# Patient Record
Sex: FEMALE | Race: WHITE | NOT HISPANIC OR LATINO | Employment: OTHER | URBAN - METROPOLITAN AREA
[De-identification: names, ages, dates, MRNs, and addresses within clinical notes are randomized per-mention and may not be internally consistent; named-entity substitution may affect disease eponyms.]

---

## 2024-01-13 ENCOUNTER — APPOINTMENT (OUTPATIENT)
Dept: RADIOLOGY | Facility: MEDICAL CENTER | Age: 69
DRG: 189 | End: 2024-01-13
Attending: STUDENT IN AN ORGANIZED HEALTH CARE EDUCATION/TRAINING PROGRAM
Payer: MEDICARE

## 2024-01-13 ENCOUNTER — HOSPITAL ENCOUNTER (INPATIENT)
Facility: MEDICAL CENTER | Age: 69
LOS: 3 days | DRG: 189 | End: 2024-01-17
Attending: STUDENT IN AN ORGANIZED HEALTH CARE EDUCATION/TRAINING PROGRAM | Admitting: STUDENT IN AN ORGANIZED HEALTH CARE EDUCATION/TRAINING PROGRAM
Payer: MEDICARE

## 2024-01-13 DIAGNOSIS — J96.01 ACUTE RESPIRATORY FAILURE WITH HYPOXIA (HCC): ICD-10-CM

## 2024-01-13 DIAGNOSIS — R53.1 WEAKNESS: ICD-10-CM

## 2024-01-13 DIAGNOSIS — R27.0 ATAXIA: ICD-10-CM

## 2024-01-13 DIAGNOSIS — R09.02 HYPOXIA: ICD-10-CM

## 2024-01-13 DIAGNOSIS — R05.9 COUGH, UNSPECIFIED TYPE: ICD-10-CM

## 2024-01-13 PROBLEM — Z86.73 H/O: CVA (CEREBROVASCULAR ACCIDENT): Status: ACTIVE | Noted: 2024-01-13

## 2024-01-13 PROBLEM — F03.90 DEMENTIA (HCC): Status: ACTIVE | Noted: 2024-01-13

## 2024-01-13 PROBLEM — J96.90 RESPIRATORY FAILURE (HCC): Status: ACTIVE | Noted: 2024-01-13

## 2024-01-13 PROBLEM — R73.9 HYPERGLYCEMIA: Status: ACTIVE | Noted: 2024-01-13

## 2024-01-13 LAB
ALBUMIN SERPL BCP-MCNC: 4.3 G/DL (ref 3.2–4.9)
ALBUMIN/GLOB SERPL: 1.4 G/DL
ALP SERPL-CCNC: 108 U/L (ref 30–99)
ALT SERPL-CCNC: 23 U/L (ref 2–50)
AMPHET UR QL SCN: NEGATIVE
ANION GAP SERPL CALC-SCNC: 13 MMOL/L (ref 7–16)
APPEARANCE UR: CLEAR
AST SERPL-CCNC: 25 U/L (ref 12–45)
BARBITURATES UR QL SCN: NEGATIVE
BASOPHILS # BLD AUTO: 0.9 % (ref 0–1.8)
BASOPHILS # BLD: 0.08 K/UL (ref 0–0.12)
BENZODIAZ UR QL SCN: NEGATIVE
BILIRUB SERPL-MCNC: 0.3 MG/DL (ref 0.1–1.5)
BILIRUB UR QL STRIP.AUTO: NEGATIVE
BUN SERPL-MCNC: 17 MG/DL (ref 8–22)
BZE UR QL SCN: NEGATIVE
CALCIUM ALBUM COR SERPL-MCNC: 8.8 MG/DL (ref 8.5–10.5)
CALCIUM SERPL-MCNC: 9 MG/DL (ref 8.5–10.5)
CANNABINOIDS UR QL SCN: NEGATIVE
CHLORIDE SERPL-SCNC: 103 MMOL/L (ref 96–112)
CO2 SERPL-SCNC: 22 MMOL/L (ref 20–33)
COLOR UR: YELLOW
CREAT SERPL-MCNC: 1.11 MG/DL (ref 0.5–1.4)
CRP SERPL HS-MCNC: 0.64 MG/DL (ref 0–0.75)
EOSINOPHIL # BLD AUTO: 0.06 K/UL (ref 0–0.51)
EOSINOPHIL NFR BLD: 0.7 % (ref 0–6.9)
ERYTHROCYTE [DISTWIDTH] IN BLOOD BY AUTOMATED COUNT: 45.5 FL (ref 35.9–50)
ETHANOL BLD-MCNC: <10.1 MG/DL
FENTANYL UR QL: NEGATIVE
FLUAV RNA SPEC QL NAA+PROBE: NEGATIVE
FLUBV RNA SPEC QL NAA+PROBE: NEGATIVE
GFR SERPLBLD CREATININE-BSD FMLA CKD-EPI: 53 ML/MIN/1.73 M 2
GLOBULIN SER CALC-MCNC: 3 G/DL (ref 1.9–3.5)
GLUCOSE BLD STRIP.AUTO-MCNC: 118 MG/DL (ref 65–99)
GLUCOSE SERPL-MCNC: 134 MG/DL (ref 65–99)
GLUCOSE UR STRIP.AUTO-MCNC: NEGATIVE MG/DL
HCT VFR BLD AUTO: 42.7 % (ref 37–47)
HGB BLD-MCNC: 13.5 G/DL (ref 12–16)
IMM GRANULOCYTES # BLD AUTO: 0.04 K/UL (ref 0–0.11)
IMM GRANULOCYTES NFR BLD AUTO: 0.5 % (ref 0–0.9)
KETONES UR STRIP.AUTO-MCNC: NEGATIVE MG/DL
LACTATE SERPL-SCNC: 1.2 MMOL/L (ref 0.5–2)
LEUKOCYTE ESTERASE UR QL STRIP.AUTO: NEGATIVE
LYMPHOCYTES # BLD AUTO: 0.96 K/UL (ref 1–4.8)
LYMPHOCYTES NFR BLD: 10.9 % (ref 22–41)
MCH RBC QN AUTO: 28.6 PG (ref 27–33)
MCHC RBC AUTO-ENTMCNC: 31.6 G/DL (ref 32.2–35.5)
MCV RBC AUTO: 90.5 FL (ref 81.4–97.8)
METHADONE UR QL SCN: NEGATIVE
MICRO URNS: NORMAL
MONOCYTES # BLD AUTO: 1 K/UL (ref 0–0.85)
MONOCYTES NFR BLD AUTO: 11.3 % (ref 0–13.4)
NEUTROPHILS # BLD AUTO: 6.69 K/UL (ref 1.82–7.42)
NEUTROPHILS NFR BLD: 75.7 % (ref 44–72)
NITRITE UR QL STRIP.AUTO: NEGATIVE
NRBC # BLD AUTO: 0 K/UL
NRBC BLD-RTO: 0 /100 WBC (ref 0–0.2)
NT-PROBNP SERPL IA-MCNC: 545 PG/ML (ref 0–125)
OPIATES UR QL SCN: NEGATIVE
OXYCODONE UR QL SCN: NEGATIVE
PCP UR QL SCN: NEGATIVE
PH UR STRIP.AUTO: 6 [PH] (ref 5–8)
PLATELET # BLD AUTO: 239 K/UL (ref 164–446)
PMV BLD AUTO: 9.7 FL (ref 9–12.9)
POTASSIUM SERPL-SCNC: 3.9 MMOL/L (ref 3.6–5.5)
PROCALCITONIN SERPL-MCNC: 0.09 NG/ML
PROPOXYPH UR QL SCN: NEGATIVE
PROT SERPL-MCNC: 7.3 G/DL (ref 6–8.2)
PROT UR QL STRIP: NEGATIVE MG/DL
RBC # BLD AUTO: 4.72 M/UL (ref 4.2–5.4)
RBC UR QL AUTO: NEGATIVE
RSV RNA SPEC QL NAA+PROBE: NEGATIVE
SARS-COV-2 RNA RESP QL NAA+PROBE: NOTDETECTED
SODIUM SERPL-SCNC: 138 MMOL/L (ref 135–145)
SP GR UR STRIP.AUTO: >=1.03
TROPONIN T SERPL-MCNC: 15 NG/L (ref 6–19)
UROBILINOGEN UR STRIP.AUTO-MCNC: 0.2 MG/DL
WBC # BLD AUTO: 8.8 K/UL (ref 4.8–10.8)

## 2024-01-13 PROCEDURE — 94640 AIRWAY INHALATION TREATMENT: CPT

## 2024-01-13 PROCEDURE — 86140 C-REACTIVE PROTEIN: CPT

## 2024-01-13 PROCEDURE — 83605 ASSAY OF LACTIC ACID: CPT

## 2024-01-13 PROCEDURE — 99285 EMERGENCY DEPT VISIT HI MDM: CPT

## 2024-01-13 PROCEDURE — 85025 COMPLETE CBC W/AUTO DIFF WBC: CPT

## 2024-01-13 PROCEDURE — 84145 PROCALCITONIN (PCT): CPT

## 2024-01-13 PROCEDURE — 700102 HCHG RX REV CODE 250 W/ 637 OVERRIDE(OP): Performed by: STUDENT IN AN ORGANIZED HEALTH CARE EDUCATION/TRAINING PROGRAM

## 2024-01-13 PROCEDURE — 81003 URINALYSIS AUTO W/O SCOPE: CPT

## 2024-01-13 PROCEDURE — 83880 ASSAY OF NATRIURETIC PEPTIDE: CPT

## 2024-01-13 PROCEDURE — 700111 HCHG RX REV CODE 636 W/ 250 OVERRIDE (IP): Mod: JZ | Performed by: STUDENT IN AN ORGANIZED HEALTH CARE EDUCATION/TRAINING PROGRAM

## 2024-01-13 PROCEDURE — 99222 1ST HOSP IP/OBS MODERATE 55: CPT | Performed by: STUDENT IN AN ORGANIZED HEALTH CARE EDUCATION/TRAINING PROGRAM

## 2024-01-13 PROCEDURE — 82077 ASSAY SPEC XCP UR&BREATH IA: CPT

## 2024-01-13 PROCEDURE — 70450 CT HEAD/BRAIN W/O DYE: CPT

## 2024-01-13 PROCEDURE — 71045 X-RAY EXAM CHEST 1 VIEW: CPT

## 2024-01-13 PROCEDURE — G0378 HOSPITAL OBSERVATION PER HR: HCPCS

## 2024-01-13 PROCEDURE — 85652 RBC SED RATE AUTOMATED: CPT

## 2024-01-13 PROCEDURE — A9270 NON-COVERED ITEM OR SERVICE: HCPCS | Performed by: STUDENT IN AN ORGANIZED HEALTH CARE EDUCATION/TRAINING PROGRAM

## 2024-01-13 PROCEDURE — 82962 GLUCOSE BLOOD TEST: CPT

## 2024-01-13 PROCEDURE — 700101 HCHG RX REV CODE 250: Performed by: STUDENT IN AN ORGANIZED HEALTH CARE EDUCATION/TRAINING PROGRAM

## 2024-01-13 PROCEDURE — 700105 HCHG RX REV CODE 258: Performed by: STUDENT IN AN ORGANIZED HEALTH CARE EDUCATION/TRAINING PROGRAM

## 2024-01-13 PROCEDURE — 36415 COLL VENOUS BLD VENIPUNCTURE: CPT

## 2024-01-13 PROCEDURE — 80053 COMPREHEN METABOLIC PANEL: CPT

## 2024-01-13 PROCEDURE — 87040 BLOOD CULTURE FOR BACTERIA: CPT

## 2024-01-13 PROCEDURE — 0241U HCHG SARS-COV-2 COVID-19 NFCT DS RESP RNA 4 TRGT ED POC: CPT

## 2024-01-13 PROCEDURE — 93005 ELECTROCARDIOGRAM TRACING: CPT | Performed by: STUDENT IN AN ORGANIZED HEALTH CARE EDUCATION/TRAINING PROGRAM

## 2024-01-13 PROCEDURE — 84484 ASSAY OF TROPONIN QUANT: CPT

## 2024-01-13 PROCEDURE — 96374 THER/PROPH/DIAG INJ IV PUSH: CPT

## 2024-01-13 PROCEDURE — 96375 TX/PRO/DX INJ NEW DRUG ADDON: CPT

## 2024-01-13 PROCEDURE — 80307 DRUG TEST PRSMV CHEM ANLYZR: CPT

## 2024-01-13 RX ORDER — FUROSEMIDE 10 MG/ML
10 INJECTION INTRAMUSCULAR; INTRAVENOUS
Status: DISCONTINUED | OUTPATIENT
Start: 2024-01-13 | End: 2024-01-14

## 2024-01-13 RX ORDER — IPRATROPIUM BROMIDE AND ALBUTEROL SULFATE 2.5; .5 MG/3ML; MG/3ML
3 SOLUTION RESPIRATORY (INHALATION)
Status: ACTIVE | OUTPATIENT
Start: 2024-01-13 | End: 2024-01-14

## 2024-01-13 RX ORDER — GUAIFENESIN/DEXTROMETHORPHAN 100-10MG/5
10 SYRUP ORAL EVERY 6 HOURS PRN
Status: DISCONTINUED | OUTPATIENT
Start: 2024-01-13 | End: 2024-01-17 | Stop reason: HOSPADM

## 2024-01-13 RX ORDER — ACETAMINOPHEN 325 MG/1
650 TABLET ORAL EVERY 6 HOURS PRN
Status: DISCONTINUED | OUTPATIENT
Start: 2024-01-13 | End: 2024-01-17 | Stop reason: HOSPADM

## 2024-01-13 RX ORDER — LABETALOL HYDROCHLORIDE 5 MG/ML
10 INJECTION, SOLUTION INTRAVENOUS EVERY 4 HOURS PRN
Status: DISCONTINUED | OUTPATIENT
Start: 2024-01-13 | End: 2024-01-17 | Stop reason: HOSPADM

## 2024-01-13 RX ORDER — MEMANTINE HYDROCHLORIDE 10 MG/1
10 TABLET ORAL 2 TIMES DAILY
Status: DISCONTINUED | OUTPATIENT
Start: 2024-01-13 | End: 2024-01-17 | Stop reason: HOSPADM

## 2024-01-13 RX ORDER — AMOXICILLIN 250 MG
2 CAPSULE ORAL 2 TIMES DAILY
Status: DISCONTINUED | OUTPATIENT
Start: 2024-01-13 | End: 2024-01-17 | Stop reason: HOSPADM

## 2024-01-13 RX ORDER — ASPIRIN 300 MG/1
300 SUPPOSITORY RECTAL DAILY
Status: DISCONTINUED | OUTPATIENT
Start: 2024-01-14 | End: 2024-01-13

## 2024-01-13 RX ORDER — BISACODYL 10 MG
10 SUPPOSITORY, RECTAL RECTAL
Status: DISCONTINUED | OUTPATIENT
Start: 2024-01-13 | End: 2024-01-17 | Stop reason: HOSPADM

## 2024-01-13 RX ORDER — POLYETHYLENE GLYCOL 3350 17 G/17G
1 POWDER, FOR SOLUTION ORAL
Status: DISCONTINUED | OUTPATIENT
Start: 2024-01-13 | End: 2024-01-17 | Stop reason: HOSPADM

## 2024-01-13 RX ORDER — QUETIAPINE FUMARATE 100 MG/1
100 TABLET, FILM COATED ORAL 2 TIMES DAILY
COMMUNITY

## 2024-01-13 RX ORDER — ATORVASTATIN CALCIUM 40 MG/1
40 TABLET, FILM COATED ORAL EVERY EVENING
Status: DISCONTINUED | OUTPATIENT
Start: 2024-01-13 | End: 2024-01-17 | Stop reason: HOSPADM

## 2024-01-13 RX ORDER — LORAZEPAM 0.5 MG/1
0.5 TABLET ORAL
Status: DISCONTINUED | OUTPATIENT
Start: 2024-01-13 | End: 2024-01-17 | Stop reason: HOSPADM

## 2024-01-13 RX ORDER — ESCITALOPRAM OXALATE 10 MG/1
20 TABLET ORAL DAILY
Status: DISCONTINUED | OUTPATIENT
Start: 2024-01-14 | End: 2024-01-17 | Stop reason: HOSPADM

## 2024-01-13 RX ORDER — DONEPEZIL HYDROCHLORIDE 10 MG/1
10 TABLET, FILM COATED ORAL DAILY
COMMUNITY

## 2024-01-13 RX ORDER — SODIUM CHLORIDE, SODIUM LACTATE, POTASSIUM CHLORIDE, AND CALCIUM CHLORIDE .6; .31; .03; .02 G/100ML; G/100ML; G/100ML; G/100ML
500 INJECTION, SOLUTION INTRAVENOUS
Status: DISCONTINUED | OUTPATIENT
Start: 2024-01-13 | End: 2024-01-17 | Stop reason: HOSPADM

## 2024-01-13 RX ORDER — SODIUM CHLORIDE, SODIUM LACTATE, POTASSIUM CHLORIDE, CALCIUM CHLORIDE 600; 310; 30; 20 MG/100ML; MG/100ML; MG/100ML; MG/100ML
1000 INJECTION, SOLUTION INTRAVENOUS ONCE
Status: COMPLETED | OUTPATIENT
Start: 2024-01-13 | End: 2024-01-13

## 2024-01-13 RX ORDER — IPRATROPIUM BROMIDE AND ALBUTEROL SULFATE 2.5; .5 MG/3ML; MG/3ML
3 SOLUTION RESPIRATORY (INHALATION)
Status: DISCONTINUED | OUTPATIENT
Start: 2024-01-13 | End: 2024-01-17 | Stop reason: HOSPADM

## 2024-01-13 RX ORDER — DONEPEZIL HYDROCHLORIDE 5 MG/1
10 TABLET, FILM COATED ORAL DAILY
Status: DISCONTINUED | OUTPATIENT
Start: 2024-01-14 | End: 2024-01-17 | Stop reason: HOSPADM

## 2024-01-13 RX ORDER — BENZONATATE 100 MG/1
100 CAPSULE ORAL 3 TIMES DAILY PRN
Status: DISCONTINUED | OUTPATIENT
Start: 2024-01-13 | End: 2024-01-17 | Stop reason: HOSPADM

## 2024-01-13 RX ORDER — ONDANSETRON 2 MG/ML
4 INJECTION INTRAMUSCULAR; INTRAVENOUS EVERY 4 HOURS PRN
Status: DISCONTINUED | OUTPATIENT
Start: 2024-01-13 | End: 2024-01-17 | Stop reason: HOSPADM

## 2024-01-13 RX ORDER — ASPIRIN 81 MG/1
81 TABLET ORAL DAILY
Status: DISCONTINUED | OUTPATIENT
Start: 2024-01-14 | End: 2024-01-17 | Stop reason: HOSPADM

## 2024-01-13 RX ORDER — ENOXAPARIN SODIUM 100 MG/ML
40 INJECTION SUBCUTANEOUS DAILY
Status: DISCONTINUED | OUTPATIENT
Start: 2024-01-14 | End: 2024-01-17 | Stop reason: HOSPADM

## 2024-01-13 RX ORDER — MEMANTINE HYDROCHLORIDE 10 MG/1
10 TABLET ORAL 2 TIMES DAILY
COMMUNITY

## 2024-01-13 RX ORDER — QUETIAPINE FUMARATE 100 MG/1
100 TABLET, FILM COATED ORAL 2 TIMES DAILY
Status: DISCONTINUED | OUTPATIENT
Start: 2024-01-13 | End: 2024-01-17 | Stop reason: HOSPADM

## 2024-01-13 RX ORDER — ASPIRIN 81 MG/1
81 TABLET, CHEWABLE ORAL DAILY
Status: DISCONTINUED | OUTPATIENT
Start: 2024-01-14 | End: 2024-01-13

## 2024-01-13 RX ORDER — ESCITALOPRAM OXALATE 20 MG/1
20 TABLET ORAL DAILY
COMMUNITY

## 2024-01-13 RX ORDER — IPRATROPIUM BROMIDE AND ALBUTEROL SULFATE 2.5; .5 MG/3ML; MG/3ML
3 SOLUTION RESPIRATORY (INHALATION) ONCE
Status: COMPLETED | OUTPATIENT
Start: 2024-01-13 | End: 2024-01-13

## 2024-01-13 RX ORDER — ONDANSETRON 4 MG/1
4 TABLET, ORALLY DISINTEGRATING ORAL EVERY 4 HOURS PRN
Status: DISCONTINUED | OUTPATIENT
Start: 2024-01-13 | End: 2024-01-17 | Stop reason: HOSPADM

## 2024-01-13 RX ADMIN — IPRATROPIUM BROMIDE AND ALBUTEROL SULFATE 3 ML: 2.5; .5 SOLUTION RESPIRATORY (INHALATION) at 21:47

## 2024-01-13 RX ADMIN — MEMANTINE HYDROCHLORIDE 10 MG: 10 TABLET ORAL at 23:11

## 2024-01-13 RX ADMIN — FUROSEMIDE 10 MG: 10 INJECTION, SOLUTION INTRAVENOUS at 23:11

## 2024-01-13 RX ADMIN — QUETIAPINE FUMARATE 100 MG: 100 TABLET ORAL at 23:11

## 2024-01-13 RX ADMIN — SODIUM CHLORIDE, POTASSIUM CHLORIDE, SODIUM LACTATE AND CALCIUM CHLORIDE 1000 ML: 600; 310; 30; 20 INJECTION, SOLUTION INTRAVENOUS at 17:00

## 2024-01-13 RX ADMIN — ATORVASTATIN CALCIUM 40 MG: 40 TABLET, FILM COATED ORAL at 23:11

## 2024-01-13 ASSESSMENT — FIBROSIS 4 INDEX: FIB4 SCORE: 1.53

## 2024-01-13 ASSESSMENT — ENCOUNTER SYMPTOMS
PALPITATIONS: 0
BLURRED VISION: 0
WEAKNESS: 1
FEVER: 0
DIZZINESS: 0
SEIZURES: 0
CHILLS: 0
NAUSEA: 0
LOSS OF CONSCIOUSNESS: 0
SHORTNESS OF BREATH: 1
DEPRESSION: 0
BRUISES/BLEEDS EASILY: 0
MYALGIAS: 0
COUGH: 0
VOMITING: 0
TINGLING: 0
SPEECH CHANGE: 0
HEARTBURN: 0
ABDOMINAL PAIN: 0
SENSORY CHANGE: 0
HEADACHES: 0
TREMORS: 1
FOCAL WEAKNESS: 0
DOUBLE VISION: 0

## 2024-01-13 ASSESSMENT — LIFESTYLE VARIABLES
DOES PATIENT WANT TO STOP DRINKING: NO
ALCOHOL_USE: NO
EVER FELT BAD OR GUILTY ABOUT YOUR DRINKING: NO
EVER HAD A DRINK FIRST THING IN THE MORNING TO STEADY YOUR NERVES TO GET RID OF A HANGOVER: NO
CONSUMPTION TOTAL: NEGATIVE
AVERAGE NUMBER OF DAYS PER WEEK YOU HAVE A DRINK CONTAINING ALCOHOL: 0
HOW MANY TIMES IN THE PAST YEAR HAVE YOU HAD 5 OR MORE DRINKS IN A DAY: 0
ON A TYPICAL DAY WHEN YOU DRINK ALCOHOL HOW MANY DRINKS DO YOU HAVE: 0
HAVE YOU EVER FELT YOU SHOULD CUT DOWN ON YOUR DRINKING: NO
TOTAL SCORE: 0
TOTAL SCORE: 0
SUBSTANCE_ABUSE: 0
HAVE PEOPLE ANNOYED YOU BY CRITICIZING YOUR DRINKING: NO
TOTAL SCORE: 0

## 2024-01-13 ASSESSMENT — PATIENT HEALTH QUESTIONNAIRE - PHQ9
2. FEELING DOWN, DEPRESSED, IRRITABLE, OR HOPELESS: NOT AT ALL
1. LITTLE INTEREST OR PLEASURE IN DOING THINGS: NOT AT ALL
SUM OF ALL RESPONSES TO PHQ9 QUESTIONS 1 AND 2: 0

## 2024-01-13 ASSESSMENT — PAIN DESCRIPTION - PAIN TYPE: TYPE: ACUTE PAIN

## 2024-01-13 NOTE — ED TRIAGE NOTES
"Chief Complaint   Patient presents with    ALOC     Per , pt awoke this morning altered and behaving strangely. Talking to herself.   Generalized weakness.   .   Hx of dementia, not currently baseline.     Weakness     Pt to triage via w/c for above.  A&Ox 0.   Pt urinated on arrival to ER while in w/c.   Pupils enlarged.    Protocol ordered.     /63   Pulse (!) 132   Temp 36.8 °C (98.3 °F) (Temporal)   Resp 18   Ht 1.651 m (5' 5\")   Wt 61.2 kg (135 lb)   SpO2 90%   BMI 22.47 kg/m²     "

## 2024-01-14 ENCOUNTER — APPOINTMENT (OUTPATIENT)
Dept: CARDIOLOGY | Facility: MEDICAL CENTER | Age: 69
DRG: 189 | End: 2024-01-14
Attending: STUDENT IN AN ORGANIZED HEALTH CARE EDUCATION/TRAINING PROGRAM
Payer: MEDICARE

## 2024-01-14 ENCOUNTER — APPOINTMENT (OUTPATIENT)
Dept: RADIOLOGY | Facility: MEDICAL CENTER | Age: 69
DRG: 189 | End: 2024-01-14
Attending: HOSPITALIST
Payer: MEDICARE

## 2024-01-14 PROBLEM — R55 SYNCOPE AND COLLAPSE: Status: ACTIVE | Noted: 2024-01-14

## 2024-01-14 PROBLEM — I95.9 HYPOTENSION: Status: ACTIVE | Noted: 2024-01-14

## 2024-01-14 LAB
ALBUMIN SERPL BCP-MCNC: 3.6 G/DL (ref 3.2–4.9)
ALBUMIN/GLOB SERPL: 1.3 G/DL
ALP SERPL-CCNC: 91 U/L (ref 30–99)
ALT SERPL-CCNC: 19 U/L (ref 2–50)
ANION GAP SERPL CALC-SCNC: 13 MMOL/L (ref 7–16)
AST SERPL-CCNC: 21 U/L (ref 12–45)
BASOPHILS # BLD AUTO: 0.8 % (ref 0–1.8)
BASOPHILS # BLD: 0.05 K/UL (ref 0–0.12)
BILIRUB SERPL-MCNC: 0.3 MG/DL (ref 0.1–1.5)
BUN SERPL-MCNC: 15 MG/DL (ref 8–22)
CALCIUM ALBUM COR SERPL-MCNC: 8.8 MG/DL (ref 8.5–10.5)
CALCIUM SERPL-MCNC: 8.5 MG/DL (ref 8.5–10.5)
CHLORIDE SERPL-SCNC: 103 MMOL/L (ref 96–112)
CHOLEST SERPL-MCNC: 134 MG/DL (ref 100–199)
CO2 SERPL-SCNC: 21 MMOL/L (ref 20–33)
CREAT SERPL-MCNC: 0.94 MG/DL (ref 0.5–1.4)
EKG IMPRESSION: NORMAL
EKG IMPRESSION: NORMAL
EOSINOPHIL # BLD AUTO: 0.02 K/UL (ref 0–0.51)
EOSINOPHIL NFR BLD: 0.3 % (ref 0–6.9)
ERYTHROCYTE [DISTWIDTH] IN BLOOD BY AUTOMATED COUNT: 44.8 FL (ref 35.9–50)
ERYTHROCYTE [SEDIMENTATION RATE] IN BLOOD BY WESTERGREN METHOD: 6 MM/HOUR (ref 0–25)
EST. AVERAGE GLUCOSE BLD GHB EST-MCNC: 114 MG/DL
GFR SERPLBLD CREATININE-BSD FMLA CKD-EPI: 65 ML/MIN/1.73 M 2
GLOBULIN SER CALC-MCNC: 2.7 G/DL (ref 1.9–3.5)
GLUCOSE SERPL-MCNC: 145 MG/DL (ref 65–99)
HBA1C MFR BLD: 5.6 % (ref 4–5.6)
HCT VFR BLD AUTO: 36.9 % (ref 37–47)
HDLC SERPL-MCNC: 58 MG/DL
HGB BLD-MCNC: 12.3 G/DL (ref 12–16)
IMM GRANULOCYTES # BLD AUTO: 0.02 K/UL (ref 0–0.11)
IMM GRANULOCYTES NFR BLD AUTO: 0.3 % (ref 0–0.9)
LDLC SERPL CALC-MCNC: 64 MG/DL
LV EJECT FRACT  99904: 60
LV EJECT FRACT MOD 2C 99903: 76.38
LV EJECT FRACT MOD 4C 99902: 59.26
LV EJECT FRACT MOD BP 99901: 66.74
LYMPHOCYTES # BLD AUTO: 1.17 K/UL (ref 1–4.8)
LYMPHOCYTES NFR BLD: 17.9 % (ref 22–41)
MAGNESIUM SERPL-MCNC: 1.8 MG/DL (ref 1.5–2.5)
MCH RBC QN AUTO: 29.3 PG (ref 27–33)
MCHC RBC AUTO-ENTMCNC: 33.3 G/DL (ref 32.2–35.5)
MCV RBC AUTO: 87.9 FL (ref 81.4–97.8)
MONOCYTES # BLD AUTO: 0.75 K/UL (ref 0–0.85)
MONOCYTES NFR BLD AUTO: 11.5 % (ref 0–13.4)
NEUTROPHILS # BLD AUTO: 4.51 K/UL (ref 1.82–7.42)
NEUTROPHILS NFR BLD: 69.2 % (ref 44–72)
NRBC # BLD AUTO: 0 K/UL
NRBC BLD-RTO: 0 /100 WBC (ref 0–0.2)
PHOSPHATE SERPL-MCNC: 3.1 MG/DL (ref 2.5–4.5)
PLATELET # BLD AUTO: 204 K/UL (ref 164–446)
PMV BLD AUTO: 10 FL (ref 9–12.9)
POTASSIUM SERPL-SCNC: 3.6 MMOL/L (ref 3.6–5.5)
PROT SERPL-MCNC: 6.3 G/DL (ref 6–8.2)
RBC # BLD AUTO: 4.2 M/UL (ref 4.2–5.4)
SODIUM SERPL-SCNC: 137 MMOL/L (ref 135–145)
TRIGL SERPL-MCNC: 62 MG/DL (ref 0–149)
TROPONIN T SERPL-MCNC: 16 NG/L (ref 6–19)
WBC # BLD AUTO: 6.5 K/UL (ref 4.8–10.8)

## 2024-01-14 PROCEDURE — 92610 EVALUATE SWALLOWING FUNCTION: CPT

## 2024-01-14 PROCEDURE — 85025 COMPLETE CBC W/AUTO DIFF WBC: CPT

## 2024-01-14 PROCEDURE — 700102 HCHG RX REV CODE 250 W/ 637 OVERRIDE(OP): Performed by: STUDENT IN AN ORGANIZED HEALTH CARE EDUCATION/TRAINING PROGRAM

## 2024-01-14 PROCEDURE — 87040 BLOOD CULTURE FOR BACTERIA: CPT

## 2024-01-14 PROCEDURE — 83735 ASSAY OF MAGNESIUM: CPT

## 2024-01-14 PROCEDURE — 80061 LIPID PANEL: CPT

## 2024-01-14 PROCEDURE — 94640 AIRWAY INHALATION TREATMENT: CPT

## 2024-01-14 PROCEDURE — 93005 ELECTROCARDIOGRAM TRACING: CPT | Performed by: STUDENT IN AN ORGANIZED HEALTH CARE EDUCATION/TRAINING PROGRAM

## 2024-01-14 PROCEDURE — 700105 HCHG RX REV CODE 258

## 2024-01-14 PROCEDURE — 99232 SBSQ HOSP IP/OBS MODERATE 35: CPT | Performed by: HOSPITALIST

## 2024-01-14 PROCEDURE — 770001 HCHG ROOM/CARE - MED/SURG/GYN PRIV*

## 2024-01-14 PROCEDURE — 84100 ASSAY OF PHOSPHORUS: CPT

## 2024-01-14 PROCEDURE — 700111 HCHG RX REV CODE 636 W/ 250 OVERRIDE (IP): Mod: JZ | Performed by: STUDENT IN AN ORGANIZED HEALTH CARE EDUCATION/TRAINING PROGRAM

## 2024-01-14 PROCEDURE — 84484 ASSAY OF TROPONIN QUANT: CPT

## 2024-01-14 PROCEDURE — 83036 HEMOGLOBIN GLYCOSYLATED A1C: CPT

## 2024-01-14 PROCEDURE — A9270 NON-COVERED ITEM OR SERVICE: HCPCS | Performed by: STUDENT IN AN ORGANIZED HEALTH CARE EDUCATION/TRAINING PROGRAM

## 2024-01-14 PROCEDURE — 93010 ELECTROCARDIOGRAM REPORT: CPT | Performed by: INTERNAL MEDICINE

## 2024-01-14 PROCEDURE — 97535 SELF CARE MNGMENT TRAINING: CPT

## 2024-01-14 PROCEDURE — 97163 PT EVAL HIGH COMPLEX 45 MIN: CPT

## 2024-01-14 PROCEDURE — 700117 HCHG RX CONTRAST REV CODE 255: Performed by: STUDENT IN AN ORGANIZED HEALTH CARE EDUCATION/TRAINING PROGRAM

## 2024-01-14 PROCEDURE — 93306 TTE W/DOPPLER COMPLETE: CPT

## 2024-01-14 PROCEDURE — 80053 COMPREHEN METABOLIC PANEL: CPT

## 2024-01-14 PROCEDURE — 70551 MRI BRAIN STEM W/O DYE: CPT

## 2024-01-14 PROCEDURE — 700101 HCHG RX REV CODE 250: Performed by: STUDENT IN AN ORGANIZED HEALTH CARE EDUCATION/TRAINING PROGRAM

## 2024-01-14 PROCEDURE — 93306 TTE W/DOPPLER COMPLETE: CPT | Mod: 26 | Performed by: INTERNAL MEDICINE

## 2024-01-14 RX ORDER — SODIUM CHLORIDE, SODIUM LACTATE, POTASSIUM CHLORIDE, AND CALCIUM CHLORIDE .6; .31; .03; .02 G/100ML; G/100ML; G/100ML; G/100ML
500 INJECTION, SOLUTION INTRAVENOUS ONCE
Status: COMPLETED | OUTPATIENT
Start: 2024-01-14 | End: 2024-01-14

## 2024-01-14 RX ADMIN — HUMAN ALBUMIN MICROSPHERES AND PERFLUTREN 3 ML: 10; .22 INJECTION, SOLUTION INTRAVENOUS at 11:45

## 2024-01-14 RX ADMIN — DOCUSATE SODIUM 50 MG AND SENNOSIDES 8.6 MG 2 TABLET: 8.6; 5 TABLET, FILM COATED ORAL at 05:38

## 2024-01-14 RX ADMIN — QUETIAPINE FUMARATE 100 MG: 100 TABLET ORAL at 17:32

## 2024-01-14 RX ADMIN — MEMANTINE HYDROCHLORIDE 10 MG: 10 TABLET ORAL at 05:38

## 2024-01-14 RX ADMIN — ASPIRIN 81 MG: 81 TABLET, COATED ORAL at 05:38

## 2024-01-14 RX ADMIN — IPRATROPIUM BROMIDE AND ALBUTEROL SULFATE 3 ML: 2.5; .5 SOLUTION RESPIRATORY (INHALATION) at 10:00

## 2024-01-14 RX ADMIN — ENOXAPARIN SODIUM 40 MG: 100 INJECTION SUBCUTANEOUS at 17:32

## 2024-01-14 RX ADMIN — DONEPEZIL HYDROCHLORIDE 10 MG: 5 TABLET, FILM COATED ORAL at 05:38

## 2024-01-14 RX ADMIN — SODIUM CHLORIDE, POTASSIUM CHLORIDE, SODIUM LACTATE AND CALCIUM CHLORIDE 500 ML: 600; 310; 30; 20 INJECTION, SOLUTION INTRAVENOUS at 05:15

## 2024-01-14 RX ADMIN — ATORVASTATIN CALCIUM 40 MG: 40 TABLET, FILM COATED ORAL at 17:32

## 2024-01-14 RX ADMIN — MEMANTINE HYDROCHLORIDE 10 MG: 10 TABLET ORAL at 17:32

## 2024-01-14 RX ADMIN — ESCITALOPRAM OXALATE 20 MG: 10 TABLET ORAL at 05:38

## 2024-01-14 ASSESSMENT — PATIENT HEALTH QUESTIONNAIRE - PHQ9
1. LITTLE INTEREST OR PLEASURE IN DOING THINGS: NOT AT ALL
2. FEELING DOWN, DEPRESSED, IRRITABLE, OR HOPELESS: NOT AT ALL
SUM OF ALL RESPONSES TO PHQ9 QUESTIONS 1 AND 2: 0

## 2024-01-14 ASSESSMENT — COGNITIVE AND FUNCTIONAL STATUS - GENERAL
MOBILITY SCORE: 9
MOVING FROM LYING ON BACK TO SITTING ON SIDE OF FLAT BED: UNABLE
TURNING FROM BACK TO SIDE WHILE IN FLAT BAD: UNABLE
SUGGESTED CMS G CODE MODIFIER MOBILITY: CM
CLIMB 3 TO 5 STEPS WITH RAILING: A LOT
STANDING UP FROM CHAIR USING ARMS: A LOT
MOVING TO AND FROM BED TO CHAIR: UNABLE
WALKING IN HOSPITAL ROOM: A LOT

## 2024-01-14 ASSESSMENT — ENCOUNTER SYMPTOMS
VOMITING: 0
HEMOPTYSIS: 0
WEIGHT LOSS: 0
SORE THROAT: 0
DEPRESSION: 0
BLURRED VISION: 0
WHEEZING: 0
MYALGIAS: 0
MEMORY LOSS: 1
COUGH: 1
FOCAL WEAKNESS: 0
CHILLS: 0
HEADACHES: 0
FEVER: 0
SHORTNESS OF BREATH: 0
BRUISES/BLEEDS EASILY: 0
DIZZINESS: 0
EYES NEGATIVE: 1
DIAPHORESIS: 0
CARDIOVASCULAR NEGATIVE: 1
WEAKNESS: 1
GASTROINTESTINAL NEGATIVE: 1
SPUTUM PRODUCTION: 0
ABDOMINAL PAIN: 0
PALPITATIONS: 0
NAUSEA: 0
MUSCULOSKELETAL NEGATIVE: 1

## 2024-01-14 ASSESSMENT — COPD QUESTIONNAIRES
HAVE YOU SMOKED AT LEAST 100 CIGARETTES IN YOUR ENTIRE LIFE: YES
DURING THE PAST 4 WEEKS HOW MUCH DID YOU FEEL SHORT OF BREATH: NONE/LITTLE OF THE TIME
COPD SCREENING SCORE: 4
DO YOU EVER COUGH UP ANY MUCUS OR PHLEGM?: NO/ONLY WITH OCCASIONAL COLDS OR INFECTIONS

## 2024-01-14 ASSESSMENT — GAIT ASSESSMENTS
ASSISTIVE DEVICE: FRONT WHEEL WALKER;HAND HELD ASSIST
GAIT LEVEL OF ASSIST: MODERATE ASSIST
DISTANCE (FEET): 30
DEVIATION: ATAXIC;BRADYKINETIC;SHUFFLED GAIT

## 2024-01-14 ASSESSMENT — PAIN DESCRIPTION - PAIN TYPE: TYPE: ACUTE PAIN

## 2024-01-14 ASSESSMENT — LIFESTYLE VARIABLES: SUBSTANCE_ABUSE: 0

## 2024-01-14 NOTE — ED NOTES
Pt transported from CT scan on [facility] back to [facility].

## 2024-01-14 NOTE — CARE PLAN
The patient is Stable - Low risk of patient condition declining or worsening    Shift Goals  Clinical Goals: O2 wean, safety, neuro checks  Patient Goals: Rest  Family Goals: Comfort, updates    Progress made toward(s) clinical / shift goals:    Problem: Knowledge Deficit - Standard  Goal: Patient and family/care givers will demonstrate understanding of plan of care, disease process/condition, diagnostic tests and medications  Outcome: Progressing     Problem: Hemodynamics  Goal: Patient's hemodynamics, fluid balance and neurologic status will be stable or improve  Outcome: Progressing     Problem: Self Care  Goal: Patient will have the ability to perform ADLs independently or with assistance (bathe, groom, dress, toilet and feed)  Outcome: Progressing       Patient is not progressing towards the following goals:

## 2024-01-14 NOTE — PROGRESS NOTES
Report received from night shift RN. Patient A&Ox2, in bed resting comfortably. VSS, patient 92% on 3L of O2, respirations even and unlabored, denies pain, patient updated on POC, bed in lowest position and locked, call light in reach.  at bed side

## 2024-01-14 NOTE — PROGRESS NOTES
[facility] Medicine Daily Progress Note    Date of Service  1/14/2024    Chief Complaint  Lizett Tabares is a 68 y.o. female admitted 1/13/2024 with generalized weakness    Hospital Course  Lizett Tabares is a 68 y.o. female with history of dementia who presented 1/13/2024 with evaluation for confusion, generalized weakness.  History obtained from patient's  at bedside in ER as patient is poor historian.   reported patient has had difficulty walking/ambulating for the past week, appears more confused than her usual self.  Therefore brought into ER for evaluation.  In ER, no acute findings noted on CT head.  CBC and CMP fairly unremarkable, initially plan for discharge by ERP after having received 1 L IVF bolus.  Her mental status appears to have improved in ER.  However, it was reported that patient required 2 L nasal cannula support.       Interval Problem Update  Axox2, systolic bp down to 70 per nursing, resolved with bolus, currently 94/50 86% on RA. Mild non productive cough, per  has been present for several days. Ongoing generalized weakness, otherwise per  she is near her baseline. She denies pain, no n/v, denies sob, no GI sx, good oral intake.     I have discussed this patient's plan of care and discharge plan at IDT rounds today with Case Management, Nursing, Nursing leadership, and other members of the IDT team.    Consultants/Specialty      Code Status  Full Code    Disposition  The patient is not medically cleared for discharge to home or a post-acute facility.      I have placed the appropriate orders for post-discharge needs.    Review of Systems  Review of Systems   Constitutional:  Positive for malaise/fatigue. Negative for chills, diaphoresis, fever and weight loss.   HENT: Negative.  Negative for sore throat.    Eyes: Negative.  Negative for blurred vision.   Respiratory:  Positive for cough. Negative for hemoptysis, sputum production, shortness of breath and  wheezing.    Cardiovascular: Negative.  Negative for chest pain, palpitations and leg swelling.   Gastrointestinal: Negative.  Negative for abdominal pain, nausea and vomiting.   Genitourinary: Negative.  Negative for dysuria.   Musculoskeletal: Negative.  Negative for myalgias.   Skin: Negative.  Negative for itching and rash.   Neurological:  Positive for weakness. Negative for dizziness, focal weakness and headaches.   Endo/Heme/Allergies: Negative.  Does not bruise/bleed easily.   Psychiatric/Behavioral:  Positive for memory loss. Negative for depression, substance abuse and suicidal ideas.    All other systems reviewed and are negative.       Physical Exam  Temp:  [36.7 °C (98.1 °F)-37.2 °C (99 °F)] 36.7 °C (98.1 °F)  Pulse:  [] 83  Resp:  [15-24] 18  BP: ()/(50-86) 94/54  SpO2:  [83 %-96 %] 95 %    Physical Exam  Vitals and nursing note reviewed. Exam conducted with a chaperone present.   Constitutional:       General: She is not in acute distress.     Appearance: Normal appearance. She is not diaphoretic.   HENT:      Head: Normocephalic.      Nose: Nose normal. No congestion.      Mouth/Throat:      Mouth: Mucous membranes are moist.      Pharynx: No oropharyngeal exudate or posterior oropharyngeal erythema.   Eyes:      Pupils: Pupils are equal, round, and reactive to light.   Cardiovascular:      Rate and Rhythm: Normal rate and regular rhythm.      Pulses: Normal pulses.      Heart sounds: Normal heart sounds.   Pulmonary:      Effort: Pulmonary effort is normal. No respiratory distress.      Breath sounds: Normal breath sounds. No stridor. No wheezing, rhonchi or rales.   Abdominal:      General: Abdomen is flat. Bowel sounds are normal. There is no distension.      Palpations: Abdomen is soft. There is no mass.      Tenderness: There is no abdominal tenderness. There is no guarding or rebound.      Hernia: No hernia is present.   Musculoskeletal:         General: No swelling or deformity.  Normal range of motion.   Skin:     General: Skin is warm and dry.      Capillary Refill: Capillary refill takes less than 2 seconds.   Neurological:      General: No focal deficit present.      Mental Status: She is alert.      Cranial Nerves: No cranial nerve deficit.      Sensory: No sensory deficit.      Motor: No weakness.      Coordination: Coordination normal.      Gait: Gait normal.      Deep Tendon Reflexes: Reflexes normal.   Psychiatric:         Mood and Affect: Mood normal.         Behavior: Behavior normal.         Fluids  No intake or output data in the 24 hours ending 01/14/24 1001    Laboratory  Recent Labs     01/13/24  1640 01/14/24  0026   WBC 8.8 6.5   RBC 4.72 4.20   HEMOGLOBIN 13.5 12.3   HEMATOCRIT 42.7 36.9*   MCV 90.5 87.9   MCH 28.6 29.3   MCHC 31.6* 33.3   RDW 45.5 44.8   PLATELETCT 239 204   MPV 9.7 10.0     Recent Labs     01/13/24  1640 01/14/24  0026   SODIUM 138 137   POTASSIUM 3.9 3.6   CHLORIDE 103 103   CO2 22 21   GLUCOSE 134* 145*   BUN 17 15   CREATININE 1.11 0.94   CALCIUM 9.0 8.5             Recent Labs     01/14/24  0026   TRIGLYCERIDE 62   HDL 58   LDL 64       Imaging  CT-HEAD W/O   Final Result      1.  Diffuse atrophy and white matter changes.   2.  No acute intracranial hemorrhage or territorial infarct.   3.  Chronic RIGHT basal ganglia and LEFT caudate lacunar infarcts.         DX-CHEST-PORTABLE (1 VIEW)   Final Result      1.  No acute cardiopulmonary abnormality detected.   2.  Cervical spine hardware, see above.      EC-ECHOCARDIOGRAM COMPLETE W/O CONT    (Results Pending)   MR-BRAIN-W/O    (Results Pending)        Assessment/Plan  * Acute respiratory failure with hypoxia (HCC)- (present on admission)  Assessment & Plan  On 2 L-wean off as tolerated  Negative influenza A/B, negative COVID  Normal procalcitonin  Mildly elevated proBNP-gentle diuresis  Check echo  Trial of nebs    H/O: CVA (cerebrovascular accident)  Assessment & Plan  Noted to have old right basal  ganglia and left cauda lacunar infarcts  ASA/statin    Hyperglycemia  Assessment & Plan  Check HbA1c    Dementia (HCC)  Assessment & Plan  Frequent reorientation  Minimize sedative  Continue home Aricept, Namenda, Lexapro, Seroquel    Ataxia  Assessment & Plan  No acute findings noted on CT head, but notable old CVA  Should be on aspirin/statin  PT/OT/ST  Check echo, MRI brain         VTE prophylaxis: Lovenox    I have performed a physical exam and reviewed and updated ROS and Plan today (1/14/2024). In review of yesterday's note (1/13/2024), there are no changes except as documented above.

## 2024-01-14 NOTE — ED NOTES
Med rec updated and complete. Allergies reviewed. Interviewed family ( ) at bedside.  currently has pts prescription bottles at bedside.   reports that donepezil was recently decreased from   Bid to daily.  No anticoagulant or antiplatelet medications.  No antibiotic use in last 30 days.      Home pharmacy  [facility] = [phone]

## 2024-01-14 NOTE — ED NOTES
Bedside report received by RN Inocencio    Oxygen:RA  Fall Risk status: sign on door, bed in lowest position/locked, call light within reach  Continuous cardiac monitoring:NSR   at bedside  AW: urine

## 2024-01-14 NOTE — CARE PLAN
The patient is Stable - Low risk of patient condition declining or worsening    Shift Goals  Clinical Goals: Monitor O2, safety  Patient Goals: Rest  Family Goals: Comfort, safety    Progress made toward(s) clinical / shift goals:      Problem: Knowledge Deficit - Standard  Goal: Patient and family/care givers will demonstrate understanding of plan of care, disease process/condition, diagnostic tests and medications  Outcome: Progressing     Problem: Hemodynamics  Goal: Patient's hemodynamics, fluid balance and neurologic status will be stable or improve  Outcome: Progressing     Problem: Fall Risk  Goal: Patient will remain free from falls  Outcome: Progressing     Problem: Impaired Gas Exchange  Goal: Patient will demonstrate improved ventilation and adequate oxygenation and participate in treatment regimen within the level of ability/situation.  Outcome: Progressing       Patient is not progressing towards the following goals:

## 2024-01-14 NOTE — ED NOTES
Pt transported upstairs by transport to T205, pt belongings and paperwork at bedside, VS stable, NAD,  at bedside aware of POC

## 2024-01-14 NOTE — H&P
Hospital Medicine History & Physical Note    Date of Service  1/13/2024    Primary Care Physician  Pcp Pt States None    Consultants  None    Code Status  Full Code    Chief Complaint  Chief Complaint   Patient presents with    ALOC     Per , pt awoke this morning altered and behaving strangely. Talking to herself.   Generalized weakness.   .   Hx of dementia, not currently baseline.     Weakness       History of Presenting Illness  Lizett Tabares is a 68 y.o. female with history of dementia who presented 1/13/2024 with evaluation for confusion, generalized weakness.  History obtained from patient's  at bedside in ER as patient is poor historian.   reported patient has had difficulty walking/ambulating for the past week, appears more confused than her usual self.  Therefore brought into ER for evaluation.  In ER, no acute findings noted on CT head.  CBC and CMP fairly unremarkable, initially plan for discharge by ERP after having received 1 L IVF bolus.  Her mental status appears to have improved in ER.  However, it was reported that patient required 2 L nasal cannula support.  Therefore admission requested by ERP.    I added COVID-19, influenza A/B, troponin T, proBNP to further investigate the cause of hypoxia.    I discussed the plan of care with patient, family, bedside RN, and pharmacy.    Review of Systems  Review of Systems   Constitutional:  Negative for chills and fever.   HENT:  Negative for hearing loss and tinnitus.    Eyes:  Negative for blurred vision and double vision.   Respiratory:  Positive for shortness of breath. Negative for cough.    Cardiovascular:  Negative for chest pain and palpitations.   Gastrointestinal:  Negative for abdominal pain, heartburn, nausea and vomiting.   Genitourinary:  Negative for dysuria and urgency.   Musculoskeletal:  Negative for joint pain and myalgias.   Skin:  Negative for itching and rash.   Neurological:  Positive for tremors and  weakness (Unable to ambulate). Negative for dizziness, tingling, sensory change, speech change, focal weakness, seizures, loss of consciousness and headaches.   Endo/Heme/Allergies:  Negative for environmental allergies. Does not bruise/bleed easily.   Psychiatric/Behavioral:  Negative for depression and substance abuse.    All other systems reviewed and are negative.      Past Medical History   has a past medical history of Dementia (HCC) and High cholesterol.    Surgical History   has no past surgical history on file.     Family History  family history is not on file.   Family history reviewed with patient. There is no family history that is pertinent to the chief complaint.     Social History   reports that she has quit smoking. Her smoking use included cigarettes. She has never used smokeless tobacco. She reports that she does not currently use alcohol. She reports that she does not currently use drugs.    Allergies  No Known Allergies    Medications  Prior to Admission Medications   Prescriptions Last Dose Informant Patient Reported? Taking?   QUEtiapine (SEROQUEL) 100 MG Tab 1/13/2024 at 0730  Yes Yes   Sig: Take 100 mg by mouth 2 times a day.   donepezil (ARICEPT) 10 MG tablet 1/13/2024 at 0730  Yes Yes   Sig: Take 10 mg by mouth every day.   escitalopram (LEXAPRO) 20 MG tablet 1/13/2024 at 0730  Yes Yes   Sig: Take 20 mg by mouth every day.   memantine (NAMENDA) 10 MG Tab 1/13/2024 at 0730  Yes Yes   Sig: Take 10 mg by mouth 2 times a day.      Facility-Administered Medications: None       Physical Exam  Temp:  [36.8 °C (98.3 °F)-37.2 °C (99 °F)] 37.2 °C (99 °F)  Pulse:  [] 95  Resp:  [15-24] 18  BP: (108-140)/(56-86) 108/56  SpO2:  [86 %-93 %] 90 %  Blood Pressure : (!) 140/86   Temperature: 36.8 °C (98.3 °F)   Pulse: 92   Respiration: 20   Pulse Oximetry: 90 %       Physical Exam  Vitals reviewed.   Constitutional:       General: She is not in acute distress.  HENT:      Head: Normocephalic and  atraumatic.      Nose: Nose normal.      Mouth/Throat:      Mouth: Mucous membranes are moist.      Pharynx: Oropharynx is clear.   Eyes:      General: No scleral icterus.     Extraocular Movements: Extraocular movements intact.   Cardiovascular:      Rate and Rhythm: Normal rate and regular rhythm.      Pulses: Normal pulses.      Heart sounds:      No friction rub.   Pulmonary:      Effort: No respiratory distress.      Breath sounds: Rales present. No wheezing.   Chest:      Chest wall: No tenderness.   Abdominal:      General: There is no distension.      Tenderness: There is no abdominal tenderness. There is no guarding or rebound.   Musculoskeletal:         General: No tenderness. Normal range of motion.      Cervical back: Neck supple. No tenderness.      Comments: Trace LE edema   Skin:     General: Skin is warm and dry.      Capillary Refill: Capillary refill takes less than 2 seconds.   Neurological:      Mental Status: She is alert and oriented to person, place, and time.      Comments: Follows command appropriately   Psychiatric:         Mood and Affect: Mood normal.         Laboratory:  Recent Labs     01/13/24  1640   WBC 8.8   RBC 4.72   HEMOGLOBIN 13.5   HEMATOCRIT 42.7   MCV 90.5   MCH 28.6   MCHC 31.6*   RDW 45.5   PLATELETCT 239   MPV 9.7     Recent Labs     01/13/24  1640   SODIUM 138   POTASSIUM 3.9   CHLORIDE 103   CO2 22   GLUCOSE 134*   BUN 17   CREATININE 1.11   CALCIUM 9.0     Recent Labs     01/13/24  1640   ALTSGPT 23   ASTSGOT 25   ALKPHOSPHAT 108*   TBILIRUBIN 0.3   GLUCOSE 134*         Recent Labs     01/13/24  1640   NTPROBNP 545*         Recent Labs     01/13/24  1640   TROPONINT 15       Imaging:  CT-HEAD W/O   Final Result      1.  Diffuse atrophy and white matter changes.   2.  No acute intracranial hemorrhage or territorial infarct.   3.  Chronic RIGHT basal ganglia and LEFT caudate lacunar infarcts.         DX-CHEST-PORTABLE (1 VIEW)   Final Result      1.  No acute  cardiopulmonary abnormality detected.   2.  Cervical spine hardware, see above.      EC-ECHOCARDIOGRAM COMPLETE W/O CONT    (Results Pending)   MR-BRAIN-W/O    (Results Pending)       X-Ray:  I have personally reviewed the images and compared with prior images.  EKG:  I have personally reviewed the images and compared with prior images.    Assessment/Plan:  Justification for Admission Status  I anticipate this patient is appropriate for observation status at this time.        * Acute respiratory failure with hypoxia (HCC)- (present on admission)  Assessment & Plan  On 2 L-wean off as tolerated  Negative influenza A/B, negative COVID  Normal procalcitonin  Mildly elevated proBNP-gentle diuresis  Check echo  Trial of nebs    Ataxia  Assessment & Plan  No acute findings noted on CT head, but notable old CVA  Should be on aspirin/statin  PT/OT/ST  Check echo, MRI brain    H/O: CVA (cerebrovascular accident)  Assessment & Plan  Noted to have old right basal ganglia and left cauda lacunar infarcts  ASA/statin    Dementia (HCC)  Assessment & Plan  Frequent reorientation  Minimize sedative  Continue home Aricept, Namenda, Lexapro, Seroquel    Hyperglycemia  Assessment & Plan  Check HbA1c        VTE prophylaxis: enoxaparin ppx

## 2024-01-14 NOTE — ASSESSMENT & PLAN NOTE
No acute findings noted on CT head, but notable old CVA  Continue aspirin/statin  PT/OT has evaluated and recs post-acute vs HH  No acute findings on MRI

## 2024-01-14 NOTE — PROGRESS NOTES
4 Eyes Skin Assessment Completed by OMKAR Jeff and OMKAR Benítez.    Head WDL  Ears WDL  Nose WDL  Mouth WDL  Neck WDL  Breast/Chest WDL  Shoulder Blades WDL  Spine WDL  (R) Arm/Elbow/Hand WDL  (L) Arm/Elbow/Hand WDL  Abdomen WDL  Groin WDL  Scrotum/Coccyx/Buttocks WDL  (R) Leg WDL  (L) Leg WDL  (R) Heel/Foot/Toe WDL  (L) Heel/Foot/Toe WDL          Devices In Places Pulse Ox and Nasal Cannula      Interventions In Place Pillows, bed alarm    Possible Skin Injury No    Pictures Uploaded Into Epic N/A  Wound Consult Placed N/A  RN Wound Prevention Protocol Ordered No

## 2024-01-14 NOTE — THERAPY
"Physical Therapy   Initial Evaluation     Patient Name: Lizett Tabares  Age:  68 y.o., Sex:  female  Medical Record #: [id number]  Today's Date: 1/14/2024     Precautions  Precautions: Fall Risk  Comments: baseline dementia    Assessment   Lizett Tabares is a 68 y.o. female with history of dementia who presented 1/13/2024 with evaluation for confusion, generalized weakness.  History obtained from patient's  at bedside in ER as patient is poor historian.   reported patient has had difficulty walking/ambulating for the past week, appears more confused than her usual self. Patient presents to PT eval with impaired cognition, motor-planning, coordination, strength and mobility.  Her spouse is present and provides the history and reports that he assists her as needed. Patient was AxOx1 for me and required physical assist with cueing for all mobility.  Unclear if this is her functional baseline as her  provides inconsistence responses. Patient will benefit from placement for further therapy vs Home with HHPT depending on in house progress. Will continue to follow.     Plan    Physical Therapy Initial Treatment Plan   Treatment Plan : Bed Mobility, Equipment, Gait Training, Neuro Re-Education / Balance, Self Care / Home Evaluation, Therapeutic Activities, Therapeutic Exercise, Stair Training  Treatment Frequency: 4 Times per Week  Duration: Until Therapy Goals Met    DC Equipment Recommendations: Unable to determine at this time  Discharge Recommendations: Recommend post-acute placement for additional physical therapy services prior to discharge home (vs HHPT depending on progress in house.  Query patient's ability to retain education)       Subjective    \"I'm ok\"     Objective       01/14/24 0945   Precautions   Precautions Fall Risk   Comments baseline dementia   Pain 0 - 10 Group   Therapist Pain Assessment 0   Prior Living Situation   Prior Services Continuous (24 Hour) Care Giving Family " "  Housing / Facility 1 Story House   Steps Into Home 2   Steps In Home 0   Equipment Owned None   Lives with - Patient's Self Care Capacity Spouse   Comments Patient's  provides 24/7 assist   Prior Level of Functional Mobility   Bed Mobility Required Assist   Transfer Status Independent   Ambulation Required Assist   Ambulation Distance community   Assistive Devices Used   (HHA)   Comments Patient's  reports that she mobilizes well but \"walks slow\" but as the evaluation continues it becomes clear that the patietn needs assist and cueing for all mobility and ADLs.  He holds her hand to assist with ambulation   History of Falls   History of Falls Yes   Cognition    Cognition / Consciousness X   Speech/ Communication Delayed Responses;Word Finding Impairment;Global Aphasia   Orientation Level Not Oriented to Place;Not Oriented to Reason;Not Oriented to Year;Not Oriented to Month;Not Oriented to Day   Level of Consciousness Alert   Ability To Follow Commands Unable to Follow 1 Step Commands   Safety Awareness Impaired   New Learning Impaired   Attention Impaired   Sequencing Impaired   Comments Patient with impaired motor planning and needs both verbal and tactile cueing for all mobility .  Her  answers all questions for her and needs to be cued to let her answe.  He reports she usually knows where she is at baseline, but that her cognitive decline has been steady for the last 3 years   Strength Upper Body   Upper Body Strength  X   Comments L UE grossly weaker than R UE   Sensation Upper Body   Comments unable to test 2/2 patient not following commands consistently   Active ROM Lower Body    Active ROM Lower Body  WDL   Strength Lower Body   Lower Body Strength  X   Comments L LE grossly weaker than the R   Neurological Concerns   Neurological Concerns Yes   Standing Posture During ADL's Lateral Lean Right   Coordination Upper Body   Coordination X   Fine Motor Coordination impaired FTN L >R "   Gross Motor Coordination impaired LANE   Coordination Lower Body    Coordination Lower Body  X   Toe Tapping Right Impaired   Toe Tapping Left Impaired   Vision   Vision Comments Patient with a R gaze preference, demos difficulty  tracking   Other Treatments   Other Treatments Provided Educated patient and family about DC recs   Balance Assessment   Sitting Balance (Static) Fair -   Sitting Balance (Dynamic) Fair -   Standing Balance (Static) Poor +   Standing Balance (Dynamic) Poor   Weight Shift Sitting Poor   Weight Shift Standing Poor   Comments w/HHA   Bed Mobility    Supine to Sit Moderate Assist   Sit to Supine Minimal Assist   Scooting Minimal Assist   Comments tressa needs step by steps assist and cueing for progression to EOB, sits at EOB with trunk rotated to her R and is unable to self-correct.   Gait Analysis   Gait Level Of Assist Moderate Assist   Assistive Device Front Wheel Walker;Hand Held Assist   Distance (Feet) 30   # of Times Distance was Traveled 2   Deviation Ataxic;Bradykinetic;Shuffled Gait   Comments attempted to use FWW for gait and patient unable to learn how to use it safely. Therefore, trialed HHA and patient's preference  is to veer to the R  with inconsistent step length and R LE in ER   Functional Mobility   Sit to Stand Minimal Assist   Bed, Chair, Wheelchair Transfer Minimal Assist   How much difficulty does the patient currently have...   Turning over in bed (including adjusting bedclothes, sheets and blankets)? 1   Sitting down on and standing up from a chair with arms (e.g., wheelchair, bedside commode, etc.) 1   Moving from lying on back to sitting on the side of the bed? 1   How much help from another person does the patient currently need...   Moving to and from a bed to a chair (including a wheelchair)? 2   Need to walk in a hospital room? 2   Climbing 3-5 steps with a railing? 2   6 clicks Mobility Score 9   Activity Tolerance   Sitting Edge of Bed 10 min   Standing 5  min   Comments needing 1L of O2 for ambulation   Patient / Family Goals    Patient / Family Goal #1 to go home   Short Term Goals    Short Term Goal # 1 in 6 visits patient will demo all functional transfers with sup and LRAD for safe DC   Short Term Goal # 2 in 6 visits patient will demo all bed mobility from a flat surface with Malcolm for safe DC   Short Term Goal # 3 in 6 visits patient will ambulate 200' with LRAD for safe DC   Education Group   Education Provided Role of Physical Therapist;Cerebral Vascular Accident;Gait Training   Role of Physical Therapist Patient Response Patient;Acceptance;Explanation;Demonstration   Gait Training Patient Response Patient;Acceptance;Explanation;Demonstration;Verbal Demonstration;Action Demonstration   CVA Patient Response Patient;Significant Other;Acceptance;Explanation;Demonstration;Verbal Demonstration   Additional Comments Spouse demos some limited health literacy   Physical Therapy Initial Treatment Plan    Treatment Plan  Bed Mobility;Equipment;Gait Training;Neuro Re-Education / Balance;Self Care / Home Evaluation;Therapeutic Activities;Therapeutic Exercise;Stair Training   Treatment Frequency 4 Times per Week   Duration Until Therapy Goals Met   Problem List    Problems Impaired Bed Mobility;Impaired Transfers;Impaired Ambulation;Functional Strength Deficit;Impaired Balance;Impaired Coordination;Decreased Activity Tolerance;Safety Awareness Deficits / Cognition;Motor Planning / Sequencing   Anticipated Discharge Equipment and Recommendations   DC Equipment Recommendations Unable to determine at this time   Discharge Recommendations Recommend post-acute placement for additional physical therapy services prior to discharge home  (vs HHPT depending on progress in house.  Query patient's ability to retain education)     Dolly Felix, PT, DPT, GCS

## 2024-01-14 NOTE — PROGRESS NOTES
Patient arrived to the unit. Pt stand to pivot to bed. Pt alert but oriented to time, place, and situation. NAD, VSS on 2 L NC.  at bedside. Pt denies pain. POC discussed with patient and , all questions addressed. Call light within reach, bed alarm in place and safety precautions in place.

## 2024-01-14 NOTE — ED PROVIDER NOTES
"ED Provider Note    CHIEF COMPLAINT  Chief Complaint   Patient presents with    ALOC     Per , pt awoke this morning altered and behaving strangely. Talking to herself.   Generalized weakness.   .   Hx of dementia, not currently baseline.     Weakness       EXTERNAL RECORDS REVIEWED  Other no records available at the time of evaluation    HPI/ROS  LIMITATION TO HISTORY   Select: Altered mental status / Confusion  OUTSIDE HISTORIAN(S):  Family  at bedside providing history below    Lizett Tabares is a 68 y.o. female who presents to the emergency department with  for evaluation of generalized weakness and increasing confusion.   who lives with the patient and is her primary caretaker reports he has a history of dementia and at baseline is ANO x 1-2 however woke up this morning very confused and generally weak.  He states that typically she walks unassisted but has been unable to get up out of bed today.  He states that when she went to bed last night she was normal.  He denies any one-sided weakness, facial droop or change in her speech.  He denies her being sick recently though does report she has had a dry cough due to it being \"cold outside\".    Currently the patient denies any pain.  She is not otherwise answering any additional questions and appears confused.    PAST MEDICAL HISTORY   has a past medical history of Dementia (HCC) and High cholesterol.    SURGICAL HISTORY  patient denies any surgical history    FAMILY HISTORY  History reviewed. No pertinent family history.    SOCIAL HISTORY  Social History     Tobacco Use    Smoking status: Former     Types: Cigarettes    Smokeless tobacco: Never   Substance and Sexual Activity    Alcohol use: Not Currently    Drug use: Not Currently    Sexual activity: Not on file       CURRENT MEDICATIONS  Home Medications       Reviewed by Randee Vick R.N. (Registered Nurse) on 01/13/24 at 8061  Med List Status: Complete     Medication " "Last Dose Status   donepezil (ARICEPT) 10 MG tablet 2024 Active   escitalopram (LEXAPRO) 20 MG tablet 2024 Active   memantine (NAMENDA) 10 MG Tab 2024 Active   QUEtiapine (SEROQUEL) 100 MG Tab 2024 Active                    ALLERGIES  No Known Allergies    PHYSICAL EXAM  VITAL SIGNS: /56   Pulse 95   Temp 37.2 °C (99 °F) (Temporal)   Resp 18   Ht 1.651 m (5' 5\")   Wt 67.4 kg (148 lb 9.4 oz)   SpO2 90%   BMI 24.73 kg/m²    Constitutional: Anxious appearing  HEENT: Atraumatic, normocephalic, pupils are equal round reactive to light, nose normal, mouth shows dry mucous membranes  Neck: Supple, no JVD, no tracheal deviation  Cardiovascular: Tachycardic, regular, no murmur, rub or gallop, 2+ pulses peripherally x4  Thorax & Lungs: Poor inspiratory effort but clear breath sounds otherwise  GI: Soft, non-distended, non-tender, no rebound  Skin: Warm, dry, no acute rash or lesion  Musculoskeletal: Moving all extremities, no acute deformity, no edema, no tenderness  Neurologic: A&Ox0, moving all extremities, symmetric .  Globally weak throughout  Psychiatric: Anxious      DIAGNOSTIC STUDIES / PROCEDURES  EKG  I have independently interpreted this EKG  Results for orders placed or performed during the hospital encounter of 24   EKG   Result Value Ref Range    Report       [facility]    Test Date:  2024  Pt Name:    FREDDIE GIRON               Department: ER  MRN:        [id number]                      Room:       T205  Gender:     Female                       Technician: DOMI  :        [id number]                   Requested By:ER TRIAGE PROTOCOL  Order #:    [id number]                    Reading MD:    Measurements  Intervals                                Axis  Rate:       87                           P:          70  LA:         181                          QRS:        16  QRSD:       101                          T:          64  QT:         375  QTc:        " 451    Interpretive Statements  Sinus rhythm  No previous ECG available for comparison           LABS  Labs Reviewed   CBC WITH DIFFERENTIAL - Abnormal; Notable for the following components:       Result Value    MCHC 31.6 (*)     Neutrophils-Polys 75.70 (*)     Lymphocytes 10.90 (*)     Lymphs (Absolute) 0.96 (*)     Monos (Absolute) 1.00 (*)     All other components within normal limits   COMP METABOLIC PANEL - Abnormal; Notable for the following components:    Glucose 134 (*)     Alkaline Phosphatase 108 (*)     All other components within normal limits   ESTIMATED GFR - Abnormal; Notable for the following components:    GFR (CKD-EPI) 53 (*)     All other components within normal limits   PROBRAIN NATRIURETIC PEPTIDE, NT - Abnormal; Notable for the following components:    NT-proBNP 545 (*)     All other components within normal limits   POCT GLUCOSE DEVICE RESULTS - Abnormal; Notable for the following components:    POC Glucose, Blood 118 (*)     All other components within normal limits   DIAGNOSTIC ALCOHOL   URINE DRUG SCREEN    Narrative:     Indication for culture:->Patient WITHOUT an indwelling Persaud  catheter in place with new onset of Dysuria, Frequency,  Urgency, and/or Suprapubic pain   LACTIC ACID   URINALYSIS,CULTURE IF INDICATED    Narrative:     Indication for culture:->Patient WITHOUT an indwelling Persaud  catheter in place with new onset of Dysuria, Frequency,  Urgency, and/or Suprapubic pain   PROCALCITONIN   CRP QUANTITIVE (NON-CARDIAC)   SED RATE   TROPONIN   BLOOD CULTURE    Narrative:     Blood Cultures X2. Draw one blood culture from central line  (including implanted port) and one blood culture  peripherally. If no central line present draw blood cultures  times two peripherally from different sites   BLOOD CULTURE    Narrative:     Blood Cultures X2. Draw one blood culture from central line  (including implanted port) and one blood culture  peripherally. If no central line present draw  blood cultures  times two peripherally from different sites   INFLUENZA A AND B, AND RSV, PCR   SARS-COV-2, PCR (IN-HOUSE)   CBC WITH DIFFERENTIAL   COMP METABOLIC PANEL   MAGNESIUM   PHOSPHORUS   TROPONIN   HEMOGLOBIN A1C   LIPID PROFILE   POCT GLUCOSE   POCT COV-2, FLU A/B, RSV BY PCR   POCT COV-2, FLU A/B, RSV BY PCR   POC COV-2, FLU A/B, RSV BY PCR         RADIOLOGY  I have independently interpreted the diagnostic imaging associated with this visit and am waiting the final reading from the radiologist.   My preliminary interpretation is as follows: CT head with no intracranial hemorrhage.  Chest x-ray with no evidence of focal consolidation consistent with pneumonia    Radiologist interpretation:   CT-HEAD W/O   Final Result      1.  Diffuse atrophy and white matter changes.   2.  No acute intracranial hemorrhage or territorial infarct.   3.  Chronic RIGHT basal ganglia and LEFT caudate lacunar infarcts.         DX-CHEST-PORTABLE (1 VIEW)   Final Result      1.  No acute cardiopulmonary abnormality detected.   2.  Cervical spine hardware, see above.      EC-ECHOCARDIOGRAM COMPLETE W/O CONT    (Results Pending)   MR-BRAIN-W/O    (Results Pending)         COURSE & MEDICAL DECISION MAKING    ED Observation Status? No; Patient does not meet criteria for ED Observation.     INITIAL ASSESSMENT, COURSE AND PLAN  Care Narrative:     Patient arrives emergency department brought in by  for evaluation of increasing confusion and generalized weakness.  Patient notably is quite tachycardic and borderline hypoxic in the room to the high 80s.  Dry cough noted.  Suspect pneumonia versus viral pneumonitis versus underlying heart failure previously undiagnosed.  Concern also for potential UTI versus urosepsis, electrolyte abnormality, intracranial hemorrhage.  Will perform broad laboratory and imaging workup to further assess.  No localizing symptoms or deficits on exam to suggest acute ischemic stroke.    Patient's workup  is largely unremarkable with no significant leukocytosis or anemia, electrolyte abnormality kidney injury or liver injury.  Troponin is normal.  Alcohol normal.  BNP not significantly elevated and no evidence of infection on urinalysis.  Viral testing likewise normal.  Patient is however hypoxic with persistent cough requiring 2 L of supplemental oxygen which she was placed on.  Suspect some underlying viral illness not detected on our routine testing.  Patient also attempted ambulation and is quite ataxic which per  is new.  Given her CT findings of multiple chronic infarctions there is a possibility of a small cerebellar stroke.  No symptoms suggestive of large vessel occlusion at this time.  Will plan for admission to the hospital for possible MRI and continued evaluation of her hypoxia, gait instability.   comfortable with this plan.    Case discussed with Dr. Casillas, admitting hospitalist who accepts the patient for admission    HYDRATION: Based on the patient's presentation of Dehydration the patient was given IV fluids. IV Hydration was used because oral hydration was not adequate alone. Upon recheck following hydration, the patient was improved.      ADDITIONAL PROBLEM LIST  Dementia  DISPOSITION AND DISCUSSIONS  I have discussed management of the patient with the following physicians and TYSHAWN's: Dr. Casillas hospitalist    Discussion of management with other [facility] or appropriate source(s): None     FINAL DIAGNOSIS  1. Hypoxia    2. Cough, unspecified type    3. Weakness    4. Ataxia    5. Acute respiratory failure with hypoxia (HCC)           Electronically signed by: Henrry Dimas M.D., 1/13/2024 4:16 PM

## 2024-01-14 NOTE — ED NOTES
Report received from Javier RN, assumed care of patient.  Call light and necessary belongings within reach.  Bed in lowest position.

## 2024-01-14 NOTE — PROGRESS NOTES
Neuro assessment and NIH complete, unable to fully score patient d/t pt being unable to follow commands. Patient very fatigue and unable to remain awake for full assessment. Pt was not able to fully comprehend commands d/t orientation status.    Adequate: hears normal conversation without difficulty

## 2024-01-14 NOTE — ED NOTES
Pt transported via wheelchair by RN to restroom.  Urine sample not obtained.  Pt assisted back on gurney by RN and family member.

## 2024-01-14 NOTE — THERAPY
"Speech Language Pathology   Clinical Swallow Evaluation     Patient Name: Lizett Tabares  AGE:  68 y.o., SEX:  female  Medical Record #: [id number]  Date of Service: 2024      History of Present Illness  67 y/o female presented  with confusion, generalized weakness. Hypoxic in ED.     No hx SLP in Epic.    CMHx: Respiratory failure with hypoxia, ataxia, dementia, hyperglycemia  PMHx: hx CVA    CT Head :  \"1.  Diffuse atrophy and white matter changes.  2.  No acute intracranial hemorrhage or territorial infarct.  3.  Chronic RIGHT basal ganglia and LEFT caudate lacunar infarcts.\"    CXR :  \"1.  No acute cardiopulmonary abnormality detected.  2.  Cervical spine hardware, see above.\"    General Information:  Vitals  Pulse Oximetry: 91 %  O2 (LPM): 2  O2 Delivery Device: Nasal Cannula  Level of Consciousness: Alert, Awake  Patient Behaviors: Confused, Drowsy  Orientation: Self, , General place. Did not answer date or reason  Follows Directives: Yes - simple commands only      Prior Living Situation & Level of Function:  Housing / Facility: [address] [facility]  Lives with - Patient's Self Care Capacity: Significant Other  Communication: Per EMR - chronic infarcts, unsure exact PLOF. SO reports \"she doesn't talk much.\"  Swallowing: WFL per pt and SO       Oral Mechanism Evaluation:  Dentition: Full dentures, Good   Facial Symmetry: Equal  Facial Sensation: Equal     Labial Observations: WFL   Lingual Observations: Midline  Motor Speech: WFL      Laryngeal Function:  Secretion Management: Adequate  Voice Quality: Presbyphonic  Cough: Perceptually WNL  Comments: No pain reported with cough, speech, or swallow      Subjective  Pt agreeable and cooperative with SLP evaluation tasks, albeit confused. Pt and SO report no difficulty with breakfast other than \"She didn't like it.\" RN reports same.      Assessment  Current Method of Nutrition: Oral diet (RG/TN)  Positioning: Manuel's (60-90 degrees)  Bolus " Administration: SLP; Pt attempted to self-feed but needed mod A  O2 (LPM): 2 O2 Delivery Device: Nasal Cannula  Factor(s) Affecting Performance: Impaired mental status, Impaired command following  Tracheostomy : No       Swallowing Trials:  Thin Liquid (TN0): WFL  Liquidised (LQ3): WFL  Easy to Chew (EC7): WFL      Comments:   Given A for feeding, pt demonstrated appropriate oral bolus stripping and containment. Presumed complete AP transit with effective bolus formation evidenced by complete clearance of bolus from oral cavity.. Functional and timely mastication of solid consistencies. No overt s/sx of aspiration noted w/ single and sequential sips of TN0 water. No increase in WOB, no cough/clear, no change in vocal quality. One to two swallows appreciated per bolus.       Clinical Impressions  Pt presents without s/sx concerning for oropharyngeal dysphagia this date. Recommend initiation of RG/TN diet with PO medication administration. Given that pt appears to be at baseline without dysphagia concern, SLP will not follow. Please re-consult SLP with s/sx concerning for aspiration or change in pt status.    Will follow for cognitive evaluation if indicated.       Recommendations  Regular solids with thin liquids  Instrumentation: None indicated at this time  Medication: As tolerated  Supervision: Feeding assistance as needed  Positioning: Fully upright and midline during oral intake, Meals sitting upright in a chair, as tolerated  Risk Management : Small bites/sips, Slow rate of intake, Physical mobility, as tolerated  Oral Care: BID         SLP Treatment Plan  Treatment Plan: None Indicated  SLP Frequency: N/A - Evaluation Only  Estimated Duration: N/A - Evaluation Only      Anticipated Discharge Needs  Discharge Recommendations: Anticipate that the patient will have no further speech therapy needs after discharge from the hospital (for dysphagia)            Patient / Family Goals  Patient / Family Goal #1:  "\"Okay.\"         Kaia Agarwal, SLP   "

## 2024-01-15 ENCOUNTER — APPOINTMENT (OUTPATIENT)
Dept: RADIOLOGY | Facility: MEDICAL CENTER | Age: 69
DRG: 189 | End: 2024-01-15
Attending: HOSPITALIST
Payer: MEDICARE

## 2024-01-15 LAB
ANION GAP SERPL CALC-SCNC: 11 MMOL/L (ref 7–16)
BASOPHILS # BLD AUTO: 0.5 % (ref 0–1.8)
BASOPHILS # BLD: 0.03 K/UL (ref 0–0.12)
BUN SERPL-MCNC: 19 MG/DL (ref 8–22)
CALCIUM SERPL-MCNC: 8.4 MG/DL (ref 8.5–10.5)
CHLORIDE SERPL-SCNC: 106 MMOL/L (ref 96–112)
CO2 SERPL-SCNC: 24 MMOL/L (ref 20–33)
CORTIS SERPL-MCNC: 7.1 UG/DL (ref 0–23)
CREAT SERPL-MCNC: 1.01 MG/DL (ref 0.5–1.4)
D DIMER PPP IA.FEU-MCNC: 1.83 UG/ML (FEU) (ref 0–0.5)
EOSINOPHIL # BLD AUTO: 0.14 K/UL (ref 0–0.51)
EOSINOPHIL NFR BLD: 2.3 % (ref 0–6.9)
ERYTHROCYTE [DISTWIDTH] IN BLOOD BY AUTOMATED COUNT: 45.1 FL (ref 35.9–50)
GFR SERPLBLD CREATININE-BSD FMLA CKD-EPI: 60 ML/MIN/1.73 M 2
GLUCOSE SERPL-MCNC: 100 MG/DL (ref 65–99)
HCT VFR BLD AUTO: 38 % (ref 37–47)
HGB BLD-MCNC: 12.5 G/DL (ref 12–16)
IMM GRANULOCYTES # BLD AUTO: 0.02 K/UL (ref 0–0.11)
IMM GRANULOCYTES NFR BLD AUTO: 0.3 % (ref 0–0.9)
LYMPHOCYTES # BLD AUTO: 1.66 K/UL (ref 1–4.8)
LYMPHOCYTES NFR BLD: 26.9 % (ref 22–41)
MCH RBC QN AUTO: 29.2 PG (ref 27–33)
MCHC RBC AUTO-ENTMCNC: 32.9 G/DL (ref 32.2–35.5)
MCV RBC AUTO: 88.8 FL (ref 81.4–97.8)
MONOCYTES # BLD AUTO: 0.9 K/UL (ref 0–0.85)
MONOCYTES NFR BLD AUTO: 14.6 % (ref 0–13.4)
NEUTROPHILS # BLD AUTO: 3.42 K/UL (ref 1.82–7.42)
NEUTROPHILS NFR BLD: 55.4 % (ref 44–72)
NRBC # BLD AUTO: 0 K/UL
NRBC BLD-RTO: 0 /100 WBC (ref 0–0.2)
PLATELET # BLD AUTO: 190 K/UL (ref 164–446)
PMV BLD AUTO: 10 FL (ref 9–12.9)
POTASSIUM SERPL-SCNC: 4 MMOL/L (ref 3.6–5.5)
RBC # BLD AUTO: 4.28 M/UL (ref 4.2–5.4)
SODIUM SERPL-SCNC: 141 MMOL/L (ref 135–145)
TSH SERPL DL<=0.005 MIU/L-ACNC: 3.16 UIU/ML (ref 0.38–5.33)
WBC # BLD AUTO: 6.2 K/UL (ref 4.8–10.8)

## 2024-01-15 PROCEDURE — 700111 HCHG RX REV CODE 636 W/ 250 OVERRIDE (IP): Mod: JZ | Performed by: STUDENT IN AN ORGANIZED HEALTH CARE EDUCATION/TRAINING PROGRAM

## 2024-01-15 PROCEDURE — 82533 TOTAL CORTISOL: CPT

## 2024-01-15 PROCEDURE — A9270 NON-COVERED ITEM OR SERVICE: HCPCS | Performed by: PHYSICAL MEDICINE & REHABILITATION

## 2024-01-15 PROCEDURE — 700102 HCHG RX REV CODE 250 W/ 637 OVERRIDE(OP): Performed by: PHYSICAL MEDICINE & REHABILITATION

## 2024-01-15 PROCEDURE — 700102 HCHG RX REV CODE 250 W/ 637 OVERRIDE(OP): Performed by: HOSPITALIST

## 2024-01-15 PROCEDURE — 770001 HCHG ROOM/CARE - MED/SURG/GYN PRIV*

## 2024-01-15 PROCEDURE — 99223 1ST HOSP IP/OBS HIGH 75: CPT | Performed by: PHYSICAL MEDICINE & REHABILITATION

## 2024-01-15 PROCEDURE — A9270 NON-COVERED ITEM OR SERVICE: HCPCS | Performed by: HOSPITALIST

## 2024-01-15 PROCEDURE — 84443 ASSAY THYROID STIM HORMONE: CPT

## 2024-01-15 PROCEDURE — 85025 COMPLETE CBC W/AUTO DIFF WBC: CPT

## 2024-01-15 PROCEDURE — 93970 EXTREMITY STUDY: CPT

## 2024-01-15 PROCEDURE — 99232 SBSQ HOSP IP/OBS MODERATE 35: CPT | Performed by: HOSPITALIST

## 2024-01-15 PROCEDURE — 80048 BASIC METABOLIC PNL TOTAL CA: CPT

## 2024-01-15 PROCEDURE — 36415 COLL VENOUS BLD VENIPUNCTURE: CPT

## 2024-01-15 PROCEDURE — A9270 NON-COVERED ITEM OR SERVICE: HCPCS | Performed by: STUDENT IN AN ORGANIZED HEALTH CARE EDUCATION/TRAINING PROGRAM

## 2024-01-15 PROCEDURE — 700102 HCHG RX REV CODE 250 W/ 637 OVERRIDE(OP): Performed by: STUDENT IN AN ORGANIZED HEALTH CARE EDUCATION/TRAINING PROGRAM

## 2024-01-15 PROCEDURE — 85379 FIBRIN DEGRADATION QUANT: CPT

## 2024-01-15 RX ORDER — MIDODRINE HYDROCHLORIDE 5 MG/1
5 TABLET ORAL
Status: DISCONTINUED | OUTPATIENT
Start: 2024-01-15 | End: 2024-01-16

## 2024-01-15 RX ORDER — HYDROCORTISONE 5 MG/1
2.5 TABLET ORAL DAILY
Status: DISCONTINUED | OUTPATIENT
Start: 2024-01-15 | End: 2024-01-16

## 2024-01-15 RX ORDER — HALOPERIDOL 5 MG/ML
1 INJECTION INTRAMUSCULAR EVERY 4 HOURS PRN
Status: DISCONTINUED | OUTPATIENT
Start: 2024-01-15 | End: 2024-01-17 | Stop reason: HOSPADM

## 2024-01-15 RX ADMIN — DONEPEZIL HYDROCHLORIDE 10 MG: 5 TABLET, FILM COATED ORAL at 05:07

## 2024-01-15 RX ADMIN — QUETIAPINE FUMARATE 100 MG: 100 TABLET ORAL at 05:08

## 2024-01-15 RX ADMIN — MEMANTINE HYDROCHLORIDE 10 MG: 10 TABLET ORAL at 05:08

## 2024-01-15 RX ADMIN — HYDROCORTISONE 2.5 MG: 5 TABLET ORAL at 12:41

## 2024-01-15 RX ADMIN — ENOXAPARIN SODIUM 40 MG: 100 INJECTION SUBCUTANEOUS at 17:33

## 2024-01-15 RX ADMIN — MEMANTINE HYDROCHLORIDE 10 MG: 10 TABLET ORAL at 19:21

## 2024-01-15 RX ADMIN — ASPIRIN 81 MG: 81 TABLET, COATED ORAL at 05:08

## 2024-01-15 RX ADMIN — MIDODRINE HYDROCHLORIDE 5 MG: 5 TABLET ORAL at 12:41

## 2024-01-15 RX ADMIN — ATORVASTATIN CALCIUM 40 MG: 40 TABLET, FILM COATED ORAL at 17:34

## 2024-01-15 RX ADMIN — MIDODRINE HYDROCHLORIDE 5 MG: 5 TABLET ORAL at 17:34

## 2024-01-15 RX ADMIN — QUETIAPINE FUMARATE 100 MG: 100 TABLET ORAL at 17:34

## 2024-01-15 RX ADMIN — ESCITALOPRAM OXALATE 20 MG: 10 TABLET ORAL at 05:07

## 2024-01-15 ASSESSMENT — ENCOUNTER SYMPTOMS
NAUSEA: 0
DIZZINESS: 0
VOMITING: 0
GASTROINTESTINAL NEGATIVE: 1
FOCAL WEAKNESS: 0
HEMOPTYSIS: 0
EYES NEGATIVE: 1
HEADACHES: 0
SORE THROAT: 0
MUSCULOSKELETAL NEGATIVE: 1
PALPITATIONS: 0
BLURRED VISION: 0
FEVER: 0
DIAPHORESIS: 0
CHILLS: 0
DEPRESSION: 0
COUGH: 0
SHORTNESS OF BREATH: 0
SPUTUM PRODUCTION: 0
BRUISES/BLEEDS EASILY: 0
MYALGIAS: 0
ABDOMINAL PAIN: 0
WEIGHT LOSS: 0
MEMORY LOSS: 1
WEAKNESS: 1
CARDIOVASCULAR NEGATIVE: 1
WHEEZING: 0

## 2024-01-15 ASSESSMENT — PAIN DESCRIPTION - PAIN TYPE: TYPE: ACUTE PAIN

## 2024-01-15 ASSESSMENT — LIFESTYLE VARIABLES: SUBSTANCE_ABUSE: 0

## 2024-01-15 NOTE — CARE PLAN
The patient is Stable - Low risk of patient condition declining or worsening    Shift Goals  Clinical Goals: Wean O2, PT/OT, neuro checks, safety  Patient Goals: Comfort, rest  Family Goals: Rest    Progress made toward(s) clinical / shift goals:      Problem: Knowledge Deficit - Standard  Goal: Patient and family/care givers will demonstrate understanding of plan of care, disease process/condition, diagnostic tests and medications  Outcome: Progressing     Problem: Hemodynamics  Goal: Patient's hemodynamics, fluid balance and neurologic status will be stable or improve  Outcome: Progressing     Problem: Fall Risk  Goal: Patient will remain free from falls  Outcome: Progressing     Problem: Impaired Gas Exchange  Goal: Patient will demonstrate improved ventilation and adequate oxygenation and participate in treatment regimen within the level of ability/situation.  Outcome: Progressing     Problem: Neuro Status  Goal: Neuro status will remain stable or improve  Outcome: Progressing       Patient is not progressing towards the following goals:

## 2024-01-15 NOTE — DISCHARGE PLANNING
[facility] Transitional Care Coordination    Physiatry consult complete.   Dr. Regalado recommending -     Patient is not a candidate for IPR     Disposition recommendations:  -Patient will benefit most from getting home to a familiar environment. She has advanced dementia and will not benefit from IPR due to lack of carryover.   -PMR will sign off, please reconsult or reach out via Voalte if further evaluation or medical management is requested      Thank you for the referral.

## 2024-01-15 NOTE — DISCHARGE PLANNING
[facility] Transitional [facility] Coordination    Referral from:  Vinny  Insurance Provider on Facesheet:Krista DICKERSON  Potential Rehab Diagnosis: TBD    Chart review indicates patient may have on going medical management and may have therapy needs to possibly meet inpatient rehab facility criteria with the goal of returning to community.    D/C support: spouse, will need to verify d/c support     Physiatry consultation forwarded per protocol.   TCC following         Thank you for the referral.

## 2024-01-15 NOTE — CONSULTS
Physical Medicine and Rehabilitation Consultation          Date of initial consultation: 1/15/2024  Consulting provider: Juan Casillas M.D.   Reason for consultation: assess for acute inpatient rehab appropriateness  LOS: 1 Day(s)    Chief complaint: confusion    HPI: The patient is a 68 y.o. right hand dominant female with a past medical history of dementia;  who presented on 1/13/2024  4:01 PM with confusion, generalized weakness.  Patient's spouse reported difficulty walking past week confused at her usual self.  CT head was negative on evaluation in ED, labs unremarkable.  Mental status improved after 1 L IV fluid, however patient did require 2 L nasal cannula oxygen, therefore patient was admitted for further workup and observation.  Negative influenza, negative COVID.  Normal procalcitonin, mildly elevated BNP.  MR brain was pursued which showed no acute abnormality.  UA negative.    The patient is pleasantly demented. She is agreeable to everything and her spouse is very nice and supportive as well. She is not oriented to anything. She reports we are in [place]. She laughs when she doesn't know the answer to something like what type of building is this. She denies pain. Spouse reports she is in much better than she was when she came in.     ROS  Pertinent positives are mentioned in the HPI, all others reviewed and are negative.    Social Hx:  1 SH  2 ZAINAB  With: Spouse, who provides 24/7 assist    THERAPY:  Restrictions: Fall risk   PT: Functional mobility   1/14: Walking 30 feet x 2 with front wheel walker at mod assist    OT: ADLs  Pending     SLP:   1/14: Regular solids, thin liquids    IMAGING:  MRI brain 1/14/2024  1.  No acute abnormality.  2.  Mild chronic microvascular ischemic disease.  3.  Moderate cerebral volume loss.    PROCEDURES:  none    PMH:  Past Medical History:   Diagnosis Date    Dementia (HCC)     High cholesterol        PSH:  History reviewed. No pertinent surgical  "history.    FHX:  Non-pertinent to today's issues    Medications:  Current Facility-Administered Medications   Medication Dose    senna-docusate (Pericolace Or Senokot S) 8.6-50 MG per tablet 2 Tablet  2 Tablet    And    polyethylene glycol/lytes (Miralax) Packet 1 Packet  1 Packet    And    bisacodyl (Dulcolax) suppository 10 mg  10 mg    LR (Bolus) infusion 500 mL  500 mL    enoxaparin (Lovenox) inj 40 mg  40 mg    acetaminophen (Tylenol) tablet 650 mg  650 mg    labetalol (Normodyne/Trandate) injection 10 mg  10 mg    ondansetron (Zofran) syringe/vial injection 4 mg  4 mg    ondansetron (Zofran ODT) dispertab 4 mg  4 mg    guaiFENesin dextromethorphan (Robitussin DM) 100-10 MG/5ML syrup 10 mL  10 mL    benzonatate (Tessalon) capsule 100 mg  100 mg    ipratropium-albuterol (DUONEB) nebulizer solution  3 mL    Respiratory Therapy Consult      donepezil (Aricept) tablet 10 mg  10 mg    escitalopram (Lexapro) tablet 20 mg  20 mg    memantine (Namenda) tablet 10 mg  10 mg    QUEtiapine (SEROquel) tablet 100 mg  100 mg    aspirin EC tablet 81 mg  81 mg    atorvastatin (Lipitor) tablet 40 mg  40 mg    LORazepam (Ativan) tablet 0.5 mg  0.5 mg       Allergies:  No Known Allergies      Physical Exam:  Vitals: BP 96/54   Pulse 70   Temp 36 °C (96.8 °F) (Temporal)   Resp 18   Ht 1.651 m (5' 5\")   Wt 67.4 kg (148 lb 9.4 oz)   SpO2 92%   Gen: NAD  Head: NC/AT  Eyes/ Nose/ Mouth: PERRLA, moist mucous membranes  Cardio: RRR, good distal perfusion, warm extremities  Pulm: normal respiratory effort, no cyanosis   Abd: Soft NTND, negative borborygmi   Ext: No peripheral edema. No calf tenderness. No clubbing.    Mental status: answers questions appropriately follows commands  Speech: fluent, no aphasia or dysarthria    Motor:      Upper Extremity  Myotome R L   Shoulder flexion C5 5 5   Elbow flexion C5 5 5   Wrist extension C6 5 5   Elbow extension C7 5 5   Finger flexion C8 5 5   Finger abduction T1 5 5     Lower Extremity " Myotome R L   Hip flexion L2 5 5   Knee extension L3 5 5   Ankle dorsiflexion L4 5 5   Toe extension L5 5 5   Ankle plantarflexion S1 5 5     Sensory:   intact to light touch through out    Labs: Reviewed and significant for   Recent Labs     246 01/15/24  0408   RBC 4.72 4.20 4.28   HEMOGLOBIN 13.5 12.3 12.5   HEMATOCRIT 42.7 36.9* 38.0   PLATELETCT 239 204 190     Recent Labs     246 01/15/24  0408   SODIUM 138 137 141   POTASSIUM 3.9 3.6 4.0   CHLORIDE 103 103 106   CO2 22 21 24   GLUCOSE 134* 145* 100*   BUN 17 15 19   CREATININE 1.11 0.94 1.01   CALCIUM 9.0 8.5 8.4*     Recent Results (from the past 24 hour(s))   EKG    Collection Time: 24  8:05 AM   Result Value Ref Range    Report       [facility]    Test Date:  2024  Pt Name:    FREDDIE GIRON               Department: [facility]  MRN:        [id number]                      Room:       [facility]  Gender:     Female                       Technician: [facility]  :        [id number]                   Requested By:SARAH MORALES  Order #:    [id number]                    Reading MD: Rigo Valentin MD    Measurements  Intervals                                Axis  Rate:       76                           P:          55  LA:         174                          QRS:        -6  QRSD:       112                          T:          55  QT:         393  QTc:        442    Interpretive Statements  Sinus rhythm  Borderline intraventricular conduction delay  Borderline T wave abnormalities  Baseline wander in lead(s) V4  Compared to ECG 2024 23:26:58  T-wave abnormality now present  Electronically Signed On 2024 17:05:53 PST by Rigo Valentin MD     EC-ECHOCARDIOGRAM COMPLETE W/ CONT    Collection Time: 24 11:26 AM   Result Value Ref Range    Eject.Frac. MOD BP 66.74     Eject.Frac. MOD 4C 59.26     Eject.Frac. MOD 2C 76.38     Left Ventrical Ejection Fraction 60    CBC WITH DIFFERENTIAL    Collection Time:  01/15/24  4:08 AM   Result Value Ref Range    WBC 6.2 4.8 - 10.8 K/uL    RBC 4.28 4.20 - 5.40 M/uL    Hemoglobin 12.5 12.0 - 16.0 g/dL    Hematocrit 38.0 37.0 - 47.0 %    MCV 88.8 81.4 - 97.8 fL    MCH 29.2 27.0 - 33.0 pg    MCHC 32.9 32.2 - 35.5 g/dL    RDW 45.1 35.9 - 50.0 fL    Platelet Count 190 164 - 446 K/uL    MPV 10.0 9.0 - 12.9 fL    Neutrophils-Polys 55.40 44.00 - 72.00 %    Lymphocytes 26.90 22.00 - 41.00 %    Monocytes 14.60 (H) 0.00 - 13.40 %    Eosinophils 2.30 0.00 - 6.90 %    Basophils 0.50 0.00 - 1.80 %    Immature Granulocytes 0.30 0.00 - 0.90 %    Nucleated RBC 0.00 0.00 - 0.20 /100 WBC    Neutrophils (Absolute) 3.42 1.82 - 7.42 K/uL    Lymphs (Absolute) 1.66 1.00 - 4.80 K/uL    Monos (Absolute) 0.90 (H) 0.00 - 0.85 K/uL    Eos (Absolute) 0.14 0.00 - 0.51 K/uL    Baso (Absolute) 0.03 0.00 - 0.12 K/uL    Immature Granulocytes (abs) 0.02 0.00 - 0.11 K/uL    NRBC (Absolute) 0.00 K/uL   Basic Metabolic Panel    Collection Time: 01/15/24  4:08 AM   Result Value Ref Range    Sodium 141 135 - 145 mmol/L    Potassium 4.0 3.6 - 5.5 mmol/L    Chloride 106 96 - 112 mmol/L    Co2 24 20 - 33 mmol/L    Glucose 100 (H) 65 - 99 mg/dL    Bun 19 8 - 22 mg/dL    Creatinine 1.01 0.50 - 1.40 mg/dL    Calcium 8.4 (L) 8.5 - 10.5 mg/dL    Anion Gap 11.0 7.0 - 16.0   CORTISOL    Collection Time: 01/15/24  4:08 AM   Result Value Ref Range    Cortisol 7.1 0.0 - 23.0 ug/dL   TSH WITH REFLEX TO FT4    Collection Time: 01/15/24  4:08 AM   Result Value Ref Range    TSH 3.160 0.380 - 5.330 uIU/mL   ESTIMATED GFR    Collection Time: 01/15/24  4:08 AM   Result Value Ref Range    GFR (CKD-EPI) 60 >60 mL/min/1.73 m 2         ASSESSMENT:  Patient is a 68 y.o. female admitted with dementia and confusion, likely due to hypotension     Rehabilitation: Impaired ADLs and mobility  Patient is not a candidate for IPR    All cases are subject to administrative review and recommendations may change    Disposition recommendations:  - Patient  will benefit most from getting home to a familiar environment. She has advanced dementia and will not benefit from IPR due to lack of carryover.   -PMR will sign off, please reconsult or reach out via Voalte if further evaluation or medical management is requested    Medical Complexity:    Confusion 2/2 hypotension   - Starting midodrine 5 mg TID  - Recommend monitoring today   - Patient's spouse was instructed to get a BP cuff and to take her BP 3 times a day for several weeks to find her true average BP   - Patients with advanced dementia can have autonomic instability and  hypotension. Patient [facility] with [facility].   - PT/OT as tolerated. OK for nursing to walk patient. Mobilization is recommended.     Dementia   - Donepezil 10mg daily   - Memantine 10mg BID   - Lexapro 20mg daily   - Seroquel 100mg BID     DVT PPX: SCDs      Thank you for allowing us to participate in the care of this patient.     Patient was seen for >80 minutes on unit/floor of which > 50% of time was spent on counseling and coordination of care regarding the above, including prognosis, risk reduction, benefits of treatment, and options for next stage of care.    Marlon Regalado, DO   Physical Medicine and Rehabilitation     Please note that this dictation was created using voice recognition software. I have made every reasonable attempt to correct obvious errors, but there may be errors of grammar and possibly content that I did not discover before finalizing the note.

## 2024-01-15 NOTE — PROGRESS NOTES
----- Message from Clifford Morillo MD sent at 1/10/2018  9:05 AM CST -----  Repeat colonoscopy with 2 day prep.   Received report from day shift RN. Assumed patient care. Pt resting in bed, NAD, A&O x1 to self, VSS on 2L NC. Denies pain. Pt confused to conversation and has difficulty following commands. POC discussed with patient and , all questions addressed. Call light within reach, safety precautions in place, bed locked and in lowest position.

## 2024-01-15 NOTE — DISCHARGE PLANNING
note:  Talked to OMKAR [facility]. She stated that desat study has been done. Notified Dr. Guerin. No O2 order yet so unable to send home O2 order.Choice for [facility] has been faxed to [facility].

## 2024-01-15 NOTE — ASSESSMENT & PLAN NOTE
Could be multifactorial with adrenal fatigue and postoral instability  Started midodrine  Will continue ivf     Cortisol ordered

## 2024-01-15 NOTE — DISCHARGE PLANNING
note:  [employer] does not take Medicare HMO.  [employer] does not take Medicare HMO.  [employer] does not take Medicare HMO.  [employer] does not want Medicare HMO      Addendum:  CHOICE MADE FOR [facility] [employer] AS THEY TAKE MEDICARE HMO.  Faxed to [facility]   No O2 order yet.

## 2024-01-15 NOTE — PROGRESS NOTES
[facility] Medicine Daily Progress Note    Date of Service  1/15/2024    Chief Complaint  Lizett Tabares is a 68 y.o. female admitted 1/13/2024 with generalized weakness    Hospital Course  Lizett Tabares is a 68 y.o. female with history of dementia who presented 1/13/2024 with evaluation for confusion, generalized weakness.  History obtained from patient's  at bedside in ER as patient is poor historian.   reported patient has had difficulty walking/ambulating for the past week, appears more confused than her usual self.  Therefore brought into ER for evaluation.  In ER, no acute findings noted on CT head.  CBC and CMP fairly unremarkable, initially plan for discharge by ERP after having received 1 L IVF bolus.  Her mental status appears to have improved in ER.  However, it was reported that patient required 2 L nasal cannula support.       Interval Problem Update  Axox2 but much more alert today,  at bedside, Hypotensive again this am 86/52 - she denies associated sx but she has not gotten out of bed. AM cortisol lower than expected, discussed trial of low dose hydrocortisone and midodrine with patient and her . Denies sob, no cough but ongoing mild hypoxia. No chest pain, no fevers, no abdominal pain or GI sx. ROS otherwise negative.     I have discussed this patient's plan of care and discharge plan at IDT rounds today with Case Management, Nursing, Nursing leadership, and other members of the IDT team.    Consultants/Specialty      Code Status  Full Code    Disposition  The patient is not medically cleared for discharge to home or a post-acute facility.      I have placed the appropriate orders for post-discharge needs.    Review of Systems  Review of Systems   Constitutional:  Positive for malaise/fatigue. Negative for chills, diaphoresis, fever and weight loss.   HENT: Negative.  Negative for sore throat.    Eyes: Negative.  Negative for blurred vision.   Respiratory:   Negative for cough, hemoptysis, sputum production, shortness of breath and wheezing.    Cardiovascular: Negative.  Negative for chest pain, palpitations and leg swelling.   Gastrointestinal: Negative.  Negative for abdominal pain, nausea and vomiting.   Genitourinary: Negative.  Negative for dysuria.   Musculoskeletal: Negative.  Negative for myalgias.   Skin: Negative.  Negative for itching and rash.   Neurological:  Positive for weakness. Negative for dizziness, focal weakness and headaches.   Endo/Heme/Allergies: Negative.  Does not bruise/bleed easily.   Psychiatric/Behavioral:  Positive for memory loss. Negative for depression, substance abuse and suicidal ideas.    All other systems reviewed and are negative.       Physical Exam  Temp:  [36 °C (96.8 °F)-36.9 °C (98.5 °F)] 36 °C (96.8 °F)  Pulse:  [63-84] 70  Resp:  [18] 18  BP: ()/(52-59) 96/54  SpO2:  [90 %-93 %] 92 %    Physical Exam  Vitals and nursing note reviewed. Exam conducted with a chaperone present.   Constitutional:       General: She is not in acute distress.     Appearance: Normal appearance. She is not diaphoretic.   HENT:      Head: Normocephalic.      Nose: Nose normal. No congestion.      Mouth/Throat:      Mouth: Mucous membranes are moist.      Pharynx: No oropharyngeal exudate or posterior oropharyngeal erythema.   Eyes:      Pupils: Pupils are equal, round, and reactive to light.   Cardiovascular:      Rate and Rhythm: Normal rate and regular rhythm.      Pulses: Normal pulses.      Heart sounds: Normal heart sounds.   Pulmonary:      Effort: Pulmonary effort is normal. No respiratory distress.      Breath sounds: Normal breath sounds. No stridor. No wheezing, rhonchi or rales.   Abdominal:      General: Abdomen is flat. Bowel sounds are normal. There is no distension.      Palpations: Abdomen is soft. There is no mass.      Tenderness: There is no abdominal tenderness. There is no guarding or rebound.      Hernia: No hernia is  present.   Musculoskeletal:         General: No swelling or deformity. Normal range of motion.   Skin:     General: Skin is warm and dry.      Capillary Refill: Capillary refill takes less than 2 seconds.   Neurological:      General: No focal deficit present.      Mental Status: She is alert.      Cranial Nerves: No cranial nerve deficit.      Sensory: No sensory deficit.      Motor: No weakness.      Coordination: Coordination normal.      Gait: Gait normal.      Deep Tendon Reflexes: Reflexes normal.   Psychiatric:         Mood and Affect: Mood normal.         Behavior: Behavior normal.         Fluids  No intake or output data in the 24 hours ending 01/15/24 0957    Laboratory  Recent Labs     01/13/24  1640 01/14/24  0026 01/15/24  0408   WBC 8.8 6.5 6.2   RBC 4.72 4.20 4.28   HEMOGLOBIN 13.5 12.3 12.5   HEMATOCRIT 42.7 36.9* 38.0   MCV 90.5 87.9 88.8   MCH 28.6 29.3 29.2   MCHC 31.6* 33.3 32.9   RDW 45.5 44.8 45.1   PLATELETCT 239 204 190   MPV 9.7 10.0 10.0     Recent Labs     01/13/24  1640 01/14/24  0026 01/15/24  0408   SODIUM 138 137 141   POTASSIUM 3.9 3.6 4.0   CHLORIDE 103 103 106   CO2 22 21 24   GLUCOSE 134* 145* 100*   BUN 17 15 19   CREATININE 1.11 0.94 1.01   CALCIUM 9.0 8.5 8.4*             Recent Labs     01/14/24  0026   TRIGLYCERIDE 62   HDL 58   LDL 64       Imaging  MR-BRAIN-W/O   Final Result      1.  No acute abnormality.   2.  Mild chronic microvascular ischemic disease.   3.  Moderate cerebral volume loss.      EC-ECHOCARDIOGRAM COMPLETE W/ CONT   Final Result      CT-HEAD W/O   Final Result      1.  Diffuse atrophy and white matter changes.   2.  No acute intracranial hemorrhage or territorial infarct.   3.  Chronic RIGHT basal ganglia and LEFT caudate lacunar infarcts.         DX-CHEST-PORTABLE (1 VIEW)   Final Result      1.  No acute cardiopulmonary abnormality detected.   2.  Cervical spine hardware, see above.      US-EXTREMITY VENOUS LOWER BILAT    (Results Pending)         Assessment/Plan  * Acute respiratory failure with hypoxia (HCC)- (present on admission)  Assessment & Plan  On 2 L- improving, continue to wean down  D dimer pending  IS encouraged  Negative influenza A/B, negative COVID  Normal procalcitonin  Mildly elevated proBNP  No acute findings on echo  Blood cultures so far negative, following  LE dopplers pending    Hypotension  Assessment & Plan  See above  Ongoing  Could be multifactorial with adrenal fatigue and postoral instability- trial of low dose hydrocortisone and midodrine  Following  Bc still pending (so far negative) to eval for occult infection    H/O: CVA (cerebrovascular accident)  Assessment & Plan  Noted to have old right basal ganglia and left cauda lacunar infarcts  ASA/statin    Hyperglycemia  Assessment & Plan  HbA1c 5.6    Dementia (HCC)  Assessment & Plan  Frequent reorientation  Minimize sedative  Continue home Aricept, Namenda, Lexapro, Seroquel    Ataxia  Assessment & Plan  No acute findings noted on CT head, but notable old CVA  Continue aspirin/statin  PT/OT/ST  No acute findings on MRI         VTE prophylaxis: Lovenox    I have performed a physical exam and reviewed and updated ROS and Plan today (1/15/2024). In review of yesterday's note (1/14/2024), there are no changes except as documented above.

## 2024-01-15 NOTE — PROGRESS NOTES
Lizett in bed,awake,A&OX1,hypotensive,250 cc fluid given blood pressure now is 96/54 mm of hg,pt able to response to verbal  not at bedside.

## 2024-01-15 NOTE — DISCHARGE PLANNING
note:  Message sent to Dr. Guerin for home O2 order. MD said pt is not ready for discharge since pt needs to be weaned down or off O2.

## 2024-01-15 NOTE — DISCHARGE PLANNING
Met with  and pt.  is pt's caregiver. Pt needs assistance mostly with IADLs. They recently moved from [place] so pt does not have a PCP here.  gave consent to have CM assist with PCP search.    CM LVM for  to assist with PCP.  Pt sees Dr. Shante Washington at the [facility] in [place].     Pharmacy is [facility] in [place].    We discussed discharge planning.  would be interested with HH services so CM asked Dr. Bhatti who is consulted and was at the bedside. MD said, pt will not need HH services as  is doind a good job caring for his wife.     Possible need for home O2 but no order yet.  said he would rather avoid having home O2.     Care Transition Team Assessment    Information Source  Orientation Level: Oriented to person  Information Given By: Spouse  Informant's Name: Rigo  Who is responsible for making decisions for patient? : Spouse  Name(s) of Primary Decision Maker: Rigo ()    Readmission Evaluation  Is this a readmission?: No    Elopement Risk  Legal Hold: No  Ambulatory or Self Mobile in Wheelchair: No-Not an Elopement Risk  Elopement Risk: Not at Risk for Elopement    Interdisciplinary Discharge Planning  Does Admitting Nurse Feel This Could be a Complex Discharge?: No  Primary Care Physician: Dr Valdovinos in [place]  Lives with - Patient's Self Care Capacity: Spouse  Patient or legal guardian wants to designate a caregiver: No  Support Systems: Spouse / Significant Other  Housing / Facility: [address]  Do You Take your Prescribed Medications Regularly: Yes  Able to Return to Previous ADL's: Yes  Mobility Issues: No  Prior Services: Continuous (24 Hour) Care Giving Family  Patient Prefers to be Discharged to:: Home  Assistance Needed: Yes  Durable Medical Equipment: Not Applicable    Discharge Preparedness  What is your plan after discharge?: Home with help  What are your discharge supports?: Spouse  Prior Functional Level: Ambulatory, Needs Assist  with Activities of Daily Living, Needs Assist with Medication Management  Difficulity with ADLs: None  Difficulity with IADLs: Cooking, Driving, Keeping track of finances, Laundry, Managing medication, Shopping, Using the telephone or computer    Functional Assesment  Prior Functional Level: Ambulatory, Needs Assist with Activities of Daily Living, Needs Assist with Medication Management    Finances  Financial Barriers to Discharge: No  Prescription Coverage: Yes    Vision / Hearing Impairment  Vision Impairment : No  Hearing Impairment : No    Values / Beliefs / Concerns  Values / Beliefs Concerns : No    Advance Directive  Advance Directive?: None    Domestic Abuse  Have you ever been the victim of abuse or violence?: No  Physical Abuse or Sexual Abuse: No  Verbal Abuse or Emotional Abuse: No  Possible Abuse/Neglect Reported to:: Not Applicable         Discharge Risks or Barriers  Discharge risks or barriers?: Post-acute placement / services  Patient risk factors: Cognitive / sensory / physical deficit    Anticipated Discharge Information  Discharge Disposition: Discharged to home/self care (01)

## 2024-01-16 ENCOUNTER — APPOINTMENT (OUTPATIENT)
Dept: RADIOLOGY | Facility: MEDICAL CENTER | Age: 69
DRG: 189 | End: 2024-01-16
Attending: HOSPITALIST
Payer: MEDICARE

## 2024-01-16 PROBLEM — J18.9 PNEUMONIA: Status: ACTIVE | Noted: 2024-01-16

## 2024-01-16 LAB
BASOPHILS # BLD AUTO: 0.6 % (ref 0–1.8)
BASOPHILS # BLD: 0.04 K/UL (ref 0–0.12)
CORTIS SERPL-MCNC: 9.9 UG/DL (ref 0–23)
EOSINOPHIL # BLD AUTO: 0.31 K/UL (ref 0–0.51)
EOSINOPHIL NFR BLD: 5 % (ref 0–6.9)
ERYTHROCYTE [DISTWIDTH] IN BLOOD BY AUTOMATED COUNT: 45.2 FL (ref 35.9–50)
HCT VFR BLD AUTO: 37.3 % (ref 37–47)
HGB BLD-MCNC: 12.1 G/DL (ref 12–16)
IMM GRANULOCYTES # BLD AUTO: 0.01 K/UL (ref 0–0.11)
IMM GRANULOCYTES NFR BLD AUTO: 0.2 % (ref 0–0.9)
LYMPHOCYTES # BLD AUTO: 2.17 K/UL (ref 1–4.8)
LYMPHOCYTES NFR BLD: 34.8 % (ref 22–41)
MCH RBC QN AUTO: 29.2 PG (ref 27–33)
MCHC RBC AUTO-ENTMCNC: 32.4 G/DL (ref 32.2–35.5)
MCV RBC AUTO: 89.9 FL (ref 81.4–97.8)
MONOCYTES # BLD AUTO: 0.79 K/UL (ref 0–0.85)
MONOCYTES NFR BLD AUTO: 12.7 % (ref 0–13.4)
NEUTROPHILS # BLD AUTO: 2.92 K/UL (ref 1.82–7.42)
NEUTROPHILS NFR BLD: 46.7 % (ref 44–72)
NRBC # BLD AUTO: 0 K/UL
NRBC BLD-RTO: 0 /100 WBC (ref 0–0.2)
PLATELET # BLD AUTO: 186 K/UL (ref 164–446)
PMV BLD AUTO: 9.8 FL (ref 9–12.9)
RBC # BLD AUTO: 4.15 M/UL (ref 4.2–5.4)
WBC # BLD AUTO: 6.2 K/UL (ref 4.8–10.8)

## 2024-01-16 PROCEDURE — 770001 HCHG ROOM/CARE - MED/SURG/GYN PRIV*

## 2024-01-16 PROCEDURE — 71275 CT ANGIOGRAPHY CHEST: CPT

## 2024-01-16 PROCEDURE — 700102 HCHG RX REV CODE 250 W/ 637 OVERRIDE(OP): Performed by: STUDENT IN AN ORGANIZED HEALTH CARE EDUCATION/TRAINING PROGRAM

## 2024-01-16 PROCEDURE — 97535 SELF CARE MNGMENT TRAINING: CPT

## 2024-01-16 PROCEDURE — A9270 NON-COVERED ITEM OR SERVICE: HCPCS | Performed by: HOSPITALIST

## 2024-01-16 PROCEDURE — 99232 SBSQ HOSP IP/OBS MODERATE 35: CPT | Performed by: HOSPITALIST

## 2024-01-16 PROCEDURE — 700101 HCHG RX REV CODE 250: Performed by: HOSPITALIST

## 2024-01-16 PROCEDURE — A9270 NON-COVERED ITEM OR SERVICE: HCPCS | Performed by: PHYSICAL MEDICINE & REHABILITATION

## 2024-01-16 PROCEDURE — 700111 HCHG RX REV CODE 636 W/ 250 OVERRIDE (IP): Performed by: HOSPITALIST

## 2024-01-16 PROCEDURE — 700102 HCHG RX REV CODE 250 W/ 637 OVERRIDE(OP): Performed by: HOSPITALIST

## 2024-01-16 PROCEDURE — 700105 HCHG RX REV CODE 258: Performed by: HOSPITALIST

## 2024-01-16 PROCEDURE — 36415 COLL VENOUS BLD VENIPUNCTURE: CPT

## 2024-01-16 PROCEDURE — 700117 HCHG RX CONTRAST REV CODE 255: Performed by: HOSPITALIST

## 2024-01-16 PROCEDURE — 700111 HCHG RX REV CODE 636 W/ 250 OVERRIDE (IP): Mod: JZ | Performed by: STUDENT IN AN ORGANIZED HEALTH CARE EDUCATION/TRAINING PROGRAM

## 2024-01-16 PROCEDURE — 700102 HCHG RX REV CODE 250 W/ 637 OVERRIDE(OP): Performed by: PHYSICAL MEDICINE & REHABILITATION

## 2024-01-16 PROCEDURE — 85025 COMPLETE CBC W/AUTO DIFF WBC: CPT

## 2024-01-16 PROCEDURE — 82533 TOTAL CORTISOL: CPT

## 2024-01-16 PROCEDURE — A9270 NON-COVERED ITEM OR SERVICE: HCPCS | Performed by: STUDENT IN AN ORGANIZED HEALTH CARE EDUCATION/TRAINING PROGRAM

## 2024-01-16 RX ORDER — PREDNISONE 20 MG/1
20 TABLET ORAL EVERY MORNING
Status: DISCONTINUED | OUTPATIENT
Start: 2024-01-16 | End: 2024-01-17 | Stop reason: HOSPADM

## 2024-01-16 RX ORDER — MIDODRINE HYDROCHLORIDE 5 MG/1
10 TABLET ORAL
Status: DISCONTINUED | OUTPATIENT
Start: 2024-01-16 | End: 2024-01-17 | Stop reason: HOSPADM

## 2024-01-16 RX ORDER — SODIUM CHLORIDE 9 MG/ML
INJECTION, SOLUTION INTRAVENOUS CONTINUOUS
Status: DISCONTINUED | OUTPATIENT
Start: 2024-01-16 | End: 2024-01-17 | Stop reason: HOSPADM

## 2024-01-16 RX ORDER — SODIUM CHLORIDE, SODIUM LACTATE, POTASSIUM CHLORIDE, AND CALCIUM CHLORIDE .6; .31; .03; .02 G/100ML; G/100ML; G/100ML; G/100ML
250 INJECTION, SOLUTION INTRAVENOUS ONCE
Status: COMPLETED | OUTPATIENT
Start: 2024-01-16 | End: 2024-01-16

## 2024-01-16 RX ADMIN — SODIUM CHLORIDE, POTASSIUM CHLORIDE, SODIUM LACTATE AND CALCIUM CHLORIDE 250 ML: 600; 310; 30; 20 INJECTION, SOLUTION INTRAVENOUS at 13:30

## 2024-01-16 RX ADMIN — ENOXAPARIN SODIUM 40 MG: 100 INJECTION SUBCUTANEOUS at 17:36

## 2024-01-16 RX ADMIN — DOCUSATE SODIUM 50 MG AND SENNOSIDES 8.6 MG 2 TABLET: 8.6; 5 TABLET, FILM COATED ORAL at 05:05

## 2024-01-16 RX ADMIN — DOXYCYCLINE 100 MG: 100 INJECTION, POWDER, LYOPHILIZED, FOR SOLUTION INTRAVENOUS at 17:39

## 2024-01-16 RX ADMIN — AMPICILLIN AND SULBACTAM 3 G: 1; 2 INJECTION, POWDER, FOR SOLUTION INTRAMUSCULAR; INTRAVENOUS at 13:30

## 2024-01-16 RX ADMIN — PREDNISONE 20 MG: 20 TABLET ORAL at 14:21

## 2024-01-16 RX ADMIN — SODIUM CHLORIDE: 9 INJECTION, SOLUTION INTRAVENOUS at 14:21

## 2024-01-16 RX ADMIN — MIDODRINE HYDROCHLORIDE 5 MG: 5 TABLET ORAL at 07:52

## 2024-01-16 RX ADMIN — QUETIAPINE FUMARATE 100 MG: 100 TABLET ORAL at 17:36

## 2024-01-16 RX ADMIN — ASPIRIN 81 MG: 81 TABLET, COATED ORAL at 05:05

## 2024-01-16 RX ADMIN — HYDROCORTISONE 2.5 MG: 5 TABLET ORAL at 05:05

## 2024-01-16 RX ADMIN — AMPICILLIN AND SULBACTAM 3 G: 1; 2 INJECTION, POWDER, FOR SOLUTION INTRAMUSCULAR; INTRAVENOUS at 20:06

## 2024-01-16 RX ADMIN — MEMANTINE HYDROCHLORIDE 10 MG: 10 TABLET ORAL at 17:37

## 2024-01-16 RX ADMIN — ESCITALOPRAM OXALATE 20 MG: 10 TABLET ORAL at 05:05

## 2024-01-16 RX ADMIN — DONEPEZIL HYDROCHLORIDE 10 MG: 5 TABLET, FILM COATED ORAL at 05:04

## 2024-01-16 RX ADMIN — MIDODRINE HYDROCHLORIDE 5 MG: 5 TABLET ORAL at 12:38

## 2024-01-16 RX ADMIN — IOHEXOL 50 ML: 350 INJECTION, SOLUTION INTRAVENOUS at 11:28

## 2024-01-16 RX ADMIN — MEMANTINE HYDROCHLORIDE 10 MG: 10 TABLET ORAL at 05:04

## 2024-01-16 RX ADMIN — ATORVASTATIN CALCIUM 40 MG: 40 TABLET, FILM COATED ORAL at 17:36

## 2024-01-16 RX ADMIN — QUETIAPINE FUMARATE 100 MG: 100 TABLET ORAL at 05:05

## 2024-01-16 RX ADMIN — MIDODRINE HYDROCHLORIDE 10 MG: 5 TABLET ORAL at 17:36

## 2024-01-16 ASSESSMENT — COGNITIVE AND FUNCTIONAL STATUS - GENERAL
SUGGESTED CMS G CODE MODIFIER DAILY ACTIVITY: CK
TOILETING: A LITTLE
PERSONAL GROOMING: A LITTLE
DAILY ACTIVITIY SCORE: 18
DRESSING REGULAR LOWER BODY CLOTHING: A LITTLE
EATING MEALS: A LITTLE
DRESSING REGULAR UPPER BODY CLOTHING: A LITTLE
HELP NEEDED FOR BATHING: A LITTLE

## 2024-01-16 ASSESSMENT — PAIN DESCRIPTION - PAIN TYPE
TYPE: ACUTE PAIN

## 2024-01-16 ASSESSMENT — ACTIVITIES OF DAILY LIVING (ADL): TOILETING: REQUIRES ASSIST

## 2024-01-16 NOTE — PROGRESS NOTES
Pt arrived to [facility]. All needs met at this time. Call light in place. Pt's partner at bedside. Bed alarm on.

## 2024-01-16 NOTE — PROGRESS NOTES
Received report from previous shift RN  Assessment complete.  A&O x 1. Patient calls appropriately.  Patient ambulates with standby assist. Bed alarm on.   Patient has 0/10 pain. Pain managed with prescribed medications.  Denies N&V. Tolerating diet.  Skin per flowsheets.  + void, + flatus, + BM.  Patient denies SOB.  SCD's refused.  Patient pleasant and cooperative with plan of care.  Review plan with of care with patient. Call light and personal belongings with in reach. Hourly rounding in place. All needs met at this time.

## 2024-01-16 NOTE — PROGRESS NOTES
Bedside report received.  Assessment complete.    A&O to self. Patient calls appropriately.  Patient ambulates with x1 assist, hand held. Patient's  at bedside. Bed alarm on.   Patient does not appear to be in pain at this time. No pharmacological interventions provided at this time.  Denies N&V. Tolerating regular diet.  + void, + flatus, - BM, last BM 1/14, per report.  Patient denies SOB.  SCD's off.    Review plan with of care with patient. Call light and personal belongings within reach. Hourly rounding in place. All needs met at this time.

## 2024-01-16 NOTE — PROGRESS NOTES
Patients IV infiltrated during contrast injection, IV removed, patient sent back upstairs, notified RN Kat, RN will call CT when a new IV is placed

## 2024-01-16 NOTE — CARE PLAN
The patient is Stable - Low risk of patient condition declining or worsening    Shift Goals  Clinical Goals: wean o2, CT scan, rest  Patient Goals: rest  Family Goals: Rest    Progress made toward(s) clinical / shift goals:    Problem: Knowledge Deficit - Standard  Goal: Patient and family/care givers will demonstrate understanding of plan of care, disease process/condition, diagnostic tests and medications  Description: Target End Date:  1-3 days or as soon as patient condition allows    Document in Patient Education    1.  Patient and family/caregiver oriented to unit, equipment, visitation policy and means for communicating concern  2.  Complete/review Learning Assessment  3.  Assess knowledge level of disease process/condition, treatment plan, diagnostic tests and medications  4.  Explain disease process/condition, treatment plan, diagnostic tests and medications  Outcome: Progressing     Problem: Fall Risk  Goal: Patient will remain free from falls  Description: Target End Date:  Prior to discharge or change in level of care    Document interventions on the [facility] Fall Risk Assessment    1.  Assess for fall risk factors  2.  Implement fall precautions  Outcome: Progressing     Problem: Self Care  Goal: Patient will have the ability to perform ADLs independently or with assistance (bathe, groom, dress, toilet and feed)  Description: Target End Date:  Prior to discharge or change in level of care    Document on ADL flowsheet    1.  Assess the capability and level of deficiency to perform ADLs  2.  Encourage family/care giver involvement  3.  Provide assistive devices  4.  Consider PT/OT evaluations  5.  Maintain support, give positive feedback, encourage self-care allowing extra time and verbal cuing as needed  6.  Avoid doing something for patients they can do themselves, but provide assistance as needed  7.  Assist in anticipating/planning individual needs  8.  Collaborate with Case Management and Social  Services to meet discharge needs  Outcome: Progressing       Patient is not progressing towards the following goals:

## 2024-01-16 NOTE — CARE PLAN
The patient is Stable - Low risk of patient condition declining or worsening    Problem: Knowledge Deficit - Standard  Goal: Patient and family/care givers will demonstrate understanding of plan of care, disease process/condition, diagnostic tests and medications  Outcome: Progressing     Problem: Fall Risk  Goal: Patient will remain free from falls  Outcome: Progressing     Shift Goals  Clinical Goals: wean O2, CT scan, rest  Patient Goals: rest  Family Goals: Rest    Progress made toward(s) clinical / shift goals:  Patient medicated per MAR. Patient on 1-2 L throughout the night. Patient's  at bedside. Patient able to rest during this shift. CT scan to happen in AM.    Patient is not progressing towards the following goals:

## 2024-01-16 NOTE — PROGRESS NOTES
Hospital Medicine Daily Progress Note    Date of Service  1/16/2024    Chief Complaint  Lizett Tabares is a 68 y.o. female admitted 1/13/2024 with generalized weakness    Hospital Course  This 68-year-old female with a past medical significant for dementia, history of CVA presented to the ER on 1/13/2023 with generalized weakness.     Patient has difficulty ambulating for the past 1 week associated with change in mentation.  Workup in ER CT head unremarkable; MRI brain showed no acute intracranial abnormality, history of prior CVA.    Patient is requiring oxygen, echocardiogram unremarkable, CT of the chest did not show PE but did show right sided consolidation.  Patient was started on Unasyn and doxycycline, will have speech therapy to evaluate for swallowing; RLL nodule.    Interval events:  -- No acute events overnight, patient blood pressure noted to be on the lower side, started midodrine 5 mg p.o. 3 times daily increased to 10.  --Stat cortisol level ordered  --CT of the chest did not show PE but did show pneumonia on the right side, started on antibiotics including Unasyn plus doxycycline.  --Regarding acute hypoxic respiratory failure, will continue RT protocol, breathing treatment, Mucinex.  PT/OT has evaluated patient, recommend postacute versus home health.  Home meds will be ordered it is noted that patient does not have a primary care physician    Patient is alert and oriented x 2, plan of care has been discussed with the patient along with nursing staff at bedside   I have discussed this patient's plan of care and discharge plan at IDT rounds today with Case Management, Nursing, Nursing leadership, and other members of the IDT team.    Consultants/Specialty      Code Status  Full Code    Disposition  Medically Cleared  I have placed the appropriate orders for post-discharge needs.    Review of Systems   Unable to obtain 2/2 patient mentation     Physical Exam  Temp:  [35.9 °C (96.6 °F)-37.2 °C (99  °F)] 36.6 °C (97.9 °F)  Pulse:  [61-77] 63  Resp:  [16-18] 16  BP: (81-95)/(48-74) 81/60  SpO2:  [89 %-100 %] 95 %    Physical Exam  Vitals and nursing note reviewed. Exam conducted with a chaperone present.   HENT:      Head: Normocephalic.      Mouth/Throat:      Mouth: Mucous membranes are moist.   Eyes:      Pupils: Pupils are equal, round, and reactive to light.   Cardiovascular:      Rate and Rhythm: Normal rate and regular rhythm.   Pulmonary:      Effort: Pulmonary effort is normal. No respiratory distress.      Breath sounds: No stridor. No wheezing.   Abdominal:      General: Abdomen is flat. Bowel sounds are normal. There is no distension.      Palpations: Abdomen is soft. There is no mass.      Tenderness: There is no abdominal tenderness. There is no guarding or rebound.      Hernia: No hernia is present.   Musculoskeletal:         General: No swelling or deformity. Normal range of motion.   Skin:     General: Skin is warm.   Neurological:      General: No focal deficit present.      Mental Status: She is alert.      Cranial Nerves: No cranial nerve deficit.      Sensory: No sensory deficit.      Motor: No weakness.      Coordination: Coordination normal.      Gait: Gait normal.      Deep Tendon Reflexes: Reflexes normal.      Comments: Alert and orineted x 2   Psychiatric:         Mood and Affect: Mood normal.         Behavior: Behavior normal.         Fluids    Intake/Output Summary (Last 24 hours) at 1/16/2024 1333  Last data filed at 1/16/2024 0501  Gross per 24 hour   Intake 360 ml   Output --   Net 360 ml       Laboratory  Recent Labs     01/14/24  0026 01/15/24  0408 01/16/24  0351   WBC 6.5 6.2 6.2   RBC 4.20 4.28 4.15*   HEMOGLOBIN 12.3 12.5 12.1   HEMATOCRIT 36.9* 38.0 37.3   MCV 87.9 88.8 89.9   MCH 29.3 29.2 29.2   MCHC 33.3 32.9 32.4   RDW 44.8 45.1 45.2   PLATELETCT 204 190 186   MPV 10.0 10.0 9.8       Recent Labs     01/13/24  1640 01/14/24  0026 01/15/24  0408   SODIUM 138 137 141    POTASSIUM 3.9 3.6 4.0   CHLORIDE 103 103 106   CO2 22 21 24   GLUCOSE 134* 145* 100*   BUN 17 15 19   CREATININE 1.11 0.94 1.01   CALCIUM 9.0 8.5 8.4*               Recent Labs     01/14/24  0026   TRIGLYCERIDE 62   HDL 58   LDL 64         Imaging  CT-CTA CHEST PULMONARY ARTERY W/ RECONS   Final Result      1.  No CT evidence for pulmonary emboli.   2.  RIGHT lower lobe consolidation with bronchial thickening and secretions consistent with pneumonia.   3.  Mild emphysematous changes.   4.  Ill-defined RIGHT lower lobe nodule measuring 5 mm.      Low Risk: No routine follow-up      High Risk: Optional CT at 12 months      Comments: Nodules less than 6 mm do not require routine follow-up, but certain patients at high risk with suspicious nodule morphology, upper lobe location, or both may warrant 12-month follow-up.      Low Risk - Minimal or absent history of smoking and of other known risk factors.      High Risk - History of smoking or of other known risk factors.      Note: These recommendations do not apply to lung cancer screening, patients with immunosuppression, or patients with known primary cancer.      Fleischner Society 2017 Guidelines for Management of Incidentally Detected Pulmonary Nodules in Adults      US-EXTREMITY VENOUS LOWER BILAT   Final Result      MR-BRAIN-W/O   Final Result      1.  No acute abnormality.   2.  Mild chronic microvascular ischemic disease.   3.  Moderate cerebral volume loss.      EC-ECHOCARDIOGRAM COMPLETE W/ CONT   Final Result      CT-HEAD W/O   Final Result      1.  Diffuse atrophy and white matter changes.   2.  No acute intracranial hemorrhage or territorial infarct.   3.  Chronic RIGHT basal ganglia and LEFT caudate lacunar infarcts.         DX-CHEST-PORTABLE (1 VIEW)   Final Result      1.  No acute cardiopulmonary abnormality detected.   2.  Cervical spine hardware, see above.           Assessment/Plan  * Acute respiratory failure with hypoxia (HCC)- (present on  admission)  Assessment & Plan  On 2 L- improving, continue to wean down   --Negative influenza A/B, negative COVID   --No acute findings on echo  Blood cultures so far negative, following  LE dopplers no DVT   CTA of chest showing  pna, started on unasyn + doxy    Pneumonia  Assessment & Plan  Starte don unasyn+ doxy    Hypotension  Assessment & Plan  Could be multifactorial with adrenal fatigue and postoral instability  Started midodrine  Will continue ivf     Cortisol ordered    H/O: CVA (cerebrovascular accident)  Assessment & Plan  Noted to have old right basal ganglia and left cauda lacunar infarcts  ASA/statin    Hyperglycemia  Assessment & Plan  HbA1c 5.6    Dementia (HCC)  Assessment & Plan  Frequent reorientation  Minimize sedative  Continue home Aricept, Namenda, Lexapro, Seroquel    Ataxia  Assessment & Plan  No acute findings noted on CT head, but notable old CVA  Continue aspirin/statin  PT/OT has evaluated and recs post-acute vs HH  No acute findings on MRI         VTE prophylaxis: Lovenox    I ve.

## 2024-01-16 NOTE — HOSPITAL COURSE
This 68-year-old female with a past medical significant for dementia, history of CVA presented to the ER on 1/13/2023 with generalized weakness.     Patient has difficulty ambulating for the past 1 week associated with change in mentation.  Workup in ER CT head unremarkable; MRI brain showed no acute intracranial abnormality, history of prior CVA, thus started on asa and statin.    Patient is requiring oxygen, echocardiogram unremarkable, CT of the chest did not show PE but did show right sided consolidation.  Patient was started on Unasyn and doxycycline.  Hospital. She will be discharged on Augmentin and doxycycline.    During the stay in the hospital, she was initially noted to be hypoxic on oxygen is finally weaned off.  Currently saturating well on room air.    Also her blood pressure is noted to be on the low side, patient be discharged on midodrine along with prednisone 10 mg for 15 days.  She is recommended to follow-up with primary care physician ASAP. For all the other chronic medical condition, she will continue her home meds.      Interval events:  -- No acute events overnight, patient blood pressure noted to be on the lower side, started midodrine 5 mg p.o. 3 times daily increased to 10.  --Stat cortisol level ordered  --CT of the chest did not show PE but did show pneumonia on the right side, started on antibiotics including Unasyn plus doxycycline.  --Regarding acute hypoxic respiratory failure, will continue RT protocol, breathing treatment, Mucinex.  PT/OT has evaluated patient, recommend postacute versus home health.  Home meds will be ordered it is noted that patient does not have a primary care physician    Patient is alert and oriented x 2, plan of care has been discussed with the patient along with nursing staff at bedside

## 2024-01-16 NOTE — PROGRESS NOTES
4 Eyes Skin Assessment Completed by OMKAR Garcia and OMKAR Diaz.    Head WDL  Ears WDL  Nose WDL  Mouth WDL  Neck WDL  Breast/Chest WDL  Shoulder Blades WDL  Spine WDL  (R) Arm/Elbow/Hand WDL  (L) Arm/Elbow/Hand Bruising  Abdomen WDL  Groin WDL  Scrotum/Coccyx/Buttocks WDL  (R) Leg WDL  (L) Leg WDL  (R) Heel/Foot/Toe WDL  (L) Heel/Foot/Toe WDL          Devices In Places Blood Pressure Cuff, Pulse Ox, and Nasal Cannula      Interventions In Place NC W/Ear Foams, Pillows, and Pressure Redistribution Mattress    Possible Skin Injury No    Pictures Uploaded Into Epic N/A  Wound Consult Placed N/A  RN Wound Prevention Protocol Ordered No

## 2024-01-17 ENCOUNTER — PHARMACY VISIT (OUTPATIENT)
Dept: PHARMACY | Facility: MEDICAL CENTER | Age: 69
End: 2024-01-17
Payer: COMMERCIAL

## 2024-01-17 VITALS
DIASTOLIC BLOOD PRESSURE: 60 MMHG | WEIGHT: 148.59 LBS | HEIGHT: 65 IN | RESPIRATION RATE: 18 BRPM | TEMPERATURE: 97.5 F | BODY MASS INDEX: 24.76 KG/M2 | HEART RATE: 50 BPM | OXYGEN SATURATION: 89 % | SYSTOLIC BLOOD PRESSURE: 99 MMHG

## 2024-01-17 LAB
BASOPHILS # BLD AUTO: 0.4 % (ref 0–1.8)
BASOPHILS # BLD: 0.02 K/UL (ref 0–0.12)
EOSINOPHIL # BLD AUTO: 0.03 K/UL (ref 0–0.51)
EOSINOPHIL NFR BLD: 0.6 % (ref 0–6.9)
ERYTHROCYTE [DISTWIDTH] IN BLOOD BY AUTOMATED COUNT: 44.7 FL (ref 35.9–50)
HCT VFR BLD AUTO: 34.3 % (ref 37–47)
HGB BLD-MCNC: 11.1 G/DL (ref 12–16)
IMM GRANULOCYTES # BLD AUTO: 0.03 K/UL (ref 0–0.11)
IMM GRANULOCYTES NFR BLD AUTO: 0.6 % (ref 0–0.9)
LYMPHOCYTES # BLD AUTO: 1.62 K/UL (ref 1–4.8)
LYMPHOCYTES NFR BLD: 31.7 % (ref 22–41)
MCH RBC QN AUTO: 28.9 PG (ref 27–33)
MCHC RBC AUTO-ENTMCNC: 32.4 G/DL (ref 32.2–35.5)
MCV RBC AUTO: 89.3 FL (ref 81.4–97.8)
MONOCYTES # BLD AUTO: 0.45 K/UL (ref 0–0.85)
MONOCYTES NFR BLD AUTO: 8.8 % (ref 0–13.4)
NEUTROPHILS # BLD AUTO: 2.96 K/UL (ref 1.82–7.42)
NEUTROPHILS NFR BLD: 57.9 % (ref 44–72)
NRBC # BLD AUTO: 0 K/UL
NRBC BLD-RTO: 0 /100 WBC (ref 0–0.2)
PLATELET # BLD AUTO: 200 K/UL (ref 164–446)
PMV BLD AUTO: 10 FL (ref 9–12.9)
RBC # BLD AUTO: 3.84 M/UL (ref 4.2–5.4)
WBC # BLD AUTO: 5.1 K/UL (ref 4.8–10.8)

## 2024-01-17 PROCEDURE — A9270 NON-COVERED ITEM OR SERVICE: HCPCS | Performed by: STUDENT IN AN ORGANIZED HEALTH CARE EDUCATION/TRAINING PROGRAM

## 2024-01-17 PROCEDURE — 700102 HCHG RX REV CODE 250 W/ 637 OVERRIDE(OP): Performed by: HOSPITALIST

## 2024-01-17 PROCEDURE — A9270 NON-COVERED ITEM OR SERVICE: HCPCS | Performed by: HOSPITALIST

## 2024-01-17 PROCEDURE — 700101 HCHG RX REV CODE 250: Performed by: HOSPITALIST

## 2024-01-17 PROCEDURE — 700111 HCHG RX REV CODE 636 W/ 250 OVERRIDE (IP): Performed by: HOSPITALIST

## 2024-01-17 PROCEDURE — 99239 HOSP IP/OBS DSCHRG MGMT >30: CPT | Performed by: HOSPITALIST

## 2024-01-17 PROCEDURE — 700102 HCHG RX REV CODE 250 W/ 637 OVERRIDE(OP): Performed by: STUDENT IN AN ORGANIZED HEALTH CARE EDUCATION/TRAINING PROGRAM

## 2024-01-17 PROCEDURE — 85025 COMPLETE CBC W/AUTO DIFF WBC: CPT

## 2024-01-17 PROCEDURE — 97530 THERAPEUTIC ACTIVITIES: CPT

## 2024-01-17 PROCEDURE — 700105 HCHG RX REV CODE 258: Performed by: HOSPITALIST

## 2024-01-17 PROCEDURE — RXMED WILLOW AMBULATORY MEDICATION CHARGE: Performed by: HOSPITALIST

## 2024-01-17 RX ORDER — AMOXICILLIN AND CLAVULANATE POTASSIUM 875; 125 MG/1; MG/1
1 TABLET, FILM COATED ORAL EVERY 12 HOURS
Qty: 10 TABLET | Refills: 0 | Status: ACTIVE | OUTPATIENT
Start: 2024-01-17 | End: 2024-01-22

## 2024-01-17 RX ORDER — DOXYCYCLINE 100 MG/1
100 TABLET ORAL EVERY 12 HOURS
Status: DISCONTINUED | OUTPATIENT
Start: 2024-01-17 | End: 2024-01-17 | Stop reason: HOSPADM

## 2024-01-17 RX ORDER — DOXYCYCLINE 100 MG/1
100 TABLET ORAL EVERY 12 HOURS
Qty: 6 TABLET | Refills: 0 | Status: ACTIVE | OUTPATIENT
Start: 2024-01-17 | End: 2024-01-20

## 2024-01-17 RX ORDER — ATORVASTATIN CALCIUM 40 MG/1
40 TABLET, FILM COATED ORAL EVERY EVENING
Qty: 30 TABLET | Refills: 0 | Status: SHIPPED | OUTPATIENT
Start: 2024-01-17

## 2024-01-17 RX ORDER — FAMOTIDINE 20 MG/1
20 TABLET, FILM COATED ORAL NIGHTLY
Qty: 30 TABLET | Refills: 0 | Status: SHIPPED | OUTPATIENT
Start: 2024-01-17 | End: 2024-02-07

## 2024-01-17 RX ORDER — PREDNISONE 10 MG/1
10 TABLET ORAL DAILY
Qty: 15 TABLET | Refills: 0 | Status: SHIPPED | OUTPATIENT
Start: 2024-01-17 | End: 2024-02-01

## 2024-01-17 RX ORDER — AMOXICILLIN 250 MG
2 CAPSULE ORAL 2 TIMES DAILY
Qty: 30 TABLET | Refills: 0 | Status: SHIPPED | OUTPATIENT
Start: 2024-01-17

## 2024-01-17 RX ORDER — AMOXICILLIN AND CLAVULANATE POTASSIUM 875; 125 MG/1; MG/1
1 TABLET, FILM COATED ORAL EVERY 12 HOURS
Status: DISCONTINUED | OUTPATIENT
Start: 2024-01-17 | End: 2024-01-17 | Stop reason: HOSPADM

## 2024-01-17 RX ORDER — POLYETHYLENE GLYCOL 3350 17 G/17G
17 POWDER, FOR SOLUTION ORAL
Qty: 15 EACH | Refills: 0 | Status: SHIPPED | OUTPATIENT
Start: 2024-01-17 | End: 2024-02-01

## 2024-01-17 RX ORDER — MIDODRINE HYDROCHLORIDE 10 MG/1
10 TABLET ORAL
Qty: 30 TABLET | Refills: 0 | Status: SHIPPED | OUTPATIENT
Start: 2024-01-17 | End: 2024-02-01

## 2024-01-17 RX ORDER — ALBUTEROL SULFATE 90 UG/1
2 AEROSOL, METERED RESPIRATORY (INHALATION) EVERY 6 HOURS PRN
Qty: 1 EACH | Refills: 0 | Status: SHIPPED | OUTPATIENT
Start: 2024-01-17 | End: 2024-02-16

## 2024-01-17 RX ORDER — ASPIRIN 81 MG/1
81 TABLET ORAL DAILY
Qty: 100 TABLET | Refills: 0 | Status: SHIPPED | OUTPATIENT
Start: 2024-01-18

## 2024-01-17 RX ADMIN — MIDODRINE HYDROCHLORIDE 10 MG: 5 TABLET ORAL at 08:17

## 2024-01-17 RX ADMIN — DONEPEZIL HYDROCHLORIDE 10 MG: 5 TABLET, FILM COATED ORAL at 04:44

## 2024-01-17 RX ADMIN — ASPIRIN 81 MG: 81 TABLET, COATED ORAL at 04:44

## 2024-01-17 RX ADMIN — AMPICILLIN AND SULBACTAM 3 G: 1; 2 INJECTION, POWDER, FOR SOLUTION INTRAMUSCULAR; INTRAVENOUS at 06:21

## 2024-01-17 RX ADMIN — AMPICILLIN AND SULBACTAM 3 G: 1; 2 INJECTION, POWDER, FOR SOLUTION INTRAMUSCULAR; INTRAVENOUS at 01:06

## 2024-01-17 RX ADMIN — MEMANTINE HYDROCHLORIDE 10 MG: 10 TABLET ORAL at 04:44

## 2024-01-17 RX ADMIN — QUETIAPINE FUMARATE 100 MG: 100 TABLET ORAL at 04:44

## 2024-01-17 RX ADMIN — DOXYCYCLINE 100 MG: 100 INJECTION, POWDER, LYOPHILIZED, FOR SOLUTION INTRAVENOUS at 04:51

## 2024-01-17 RX ADMIN — ESCITALOPRAM OXALATE 20 MG: 10 TABLET ORAL at 04:44

## 2024-01-17 RX ADMIN — MIDODRINE HYDROCHLORIDE 10 MG: 5 TABLET ORAL at 11:36

## 2024-01-17 RX ADMIN — PREDNISONE 20 MG: 20 TABLET ORAL at 04:44

## 2024-01-17 ASSESSMENT — COGNITIVE AND FUNCTIONAL STATUS - GENERAL
MOVING TO AND FROM BED TO CHAIR: A LITTLE
SUGGESTED CMS G CODE MODIFIER MOBILITY: CK
MOBILITY SCORE: 17
TURNING FROM BACK TO SIDE WHILE IN FLAT BAD: A LITTLE
WALKING IN HOSPITAL ROOM: A LITTLE
MOVING FROM LYING ON BACK TO SITTING ON SIDE OF FLAT BED: A LITTLE
CLIMB 3 TO 5 STEPS WITH RAILING: A LOT
STANDING UP FROM CHAIR USING ARMS: A LITTLE

## 2024-01-17 ASSESSMENT — PAIN DESCRIPTION - PAIN TYPE: TYPE: ACUTE PAIN

## 2024-01-17 ASSESSMENT — GAIT ASSESSMENTS
GAIT LEVEL OF ASSIST: CONTACT GUARD ASSIST
DISTANCE (FEET): 200
DEVIATION: ATAXIC

## 2024-01-17 NOTE — DOCUMENTATION QUERY
[facility]                                                                       Query Response Note      PATIENT:               FREDDIE GIRON  ACCT #:                  [id number]  MRN:                     [id number]  :                      1955  ADMIT DATE:       2024 4:01 PM  DISCH DATE:          RESPONDING  PROVIDER #:        [id number]           QUERY TEXT:    Encephalopathy is documented in the Medical Record. Please specify type.      The patient's Clinical Indicators include:  Findings:  ED: altered and behaving strangely, talking to herself history of dementia not currently baseline, alert and oriented x0      HP: spo2 86-93%  alert and oriented to person, place, and time.     Comments: Follows command appropriately    1/15 PN: presented for confusion,  reported more confused than her usual self   Axox2 but much more alert today,  at bedside, Hypotensive again this am      Treatment: oxygen,     Risk Factors: history of dementia, acute respiratory failure with hypoxia, hypotension, low cortisol      Sosa Holliday RN BSN  Clinical Documentation   [email or web address]  Connect via [employer]  Options provided:   -- Metabolic encephalopathy   -- Other type of encephalopathy   -- Other explanation, (please specify other explanation)      Query created by: Sosa Walton on 2024 4:05 AM    RESPONSE TEXT:    Metabolic encephalopathy          Electronically signed by:  JO PERES MD 2024 11:20 AM

## 2024-01-17 NOTE — DISCHARGE PLANNING
"Case Management Discharge Planning    Admission Date: 1/13/2024  GMLOS: 3.6  ALOS: 3    6-Clicks ADL Score: 18  6-Clicks Mobility Score: 17  PT and/or OT Eval ordered: Yes  Post-acute Referrals Ordered: Yes  Post-acute Choice Obtained: Yes  Has referral(s) been sent to post-acute provider:  Yes      Anticipated Discharge Dispo: Discharge Disposition: D/T to home under HHA care in anticipation of covered skilled care (06)    DME Needed: No    Action(s) Taken: Updated Provider/Nurse on Discharge Plan  Per Dr Quiroga ,Pt is medically cleared to discharge to home today with  Rigo.     Per MD , Pt does not need home oxygen set up on discharge.     Per MIRI Montalvo , \" Rigo stated he has been primary caregiver of Pt  for three years and no home health is needed at this time.\" \" Rigo to call insurance and obtain PCP for both of them for follow-up appointments post-discharge home.\"     Escalations Completed: None    Medically Clear: Yes    Next Steps:   CM to continue to assist Pt with discharge as needed    Barriers to Discharge: None    Is the patient up for discharge tomorrow: No        "

## 2024-01-17 NOTE — DISCHARGE INSTRUCTIONS
Please make an appointment at [facility] for primary care physician as soon as possible    Also buy a blood pressure machine at [facility]; take a blood pressure on a daily basis.  I sent her on a medication called midodrine to increase her blood pressure.  If her blood pressure is greater than 110; no need to take midodrine

## 2024-01-17 NOTE — THERAPY
Physical Therapy   Daily Treatment     Patient Name: Lizett Tabares  Age:  68 y.o., Sex:  female  Medical Record #: [id number]  Today's Date: 1/17/2024     Precautions  Precautions: Fall Risk  Comments: dementia    Assessment    Pt seen for PT treatment session, spouse present for therapy session and remains very supportive. Pt demonstrated improved seated/standing balance, gait, activity tolerance during functional transfers and ambulation x200 ft w/o AD. Pt continues to require direct cues to initiate and terminate task which spouse reports is baseline. Pt appears functionally capable of DC home with spouse support when medically cleared. Spouse reports they have looked into respite services but did not qualify for affordable services due to income. Pt with no further acute PT needs at this time. Encourage ambulation with nursing OR family while pt remains in house. Thanks    Plan  DC from acute PT, goals met for DC home.     DC Equipment Recommendations: None  Discharge Recommendations: Anticipate that the patient will have no further physical therapy needs after discharge from the hospital (possible caregiver/ respite resources ;however, spouse reports they have not qualified in the past based on income)       01/17/24 8049   Precautions   Precautions Fall Risk   Comments dementia   Vitals   O2 Delivery Device None - Room Air   Pain 0 - 10 Group   Therapist Pain Assessment During Activity;Nurse Notified;0   Cognition    Cognition / Consciousness X   Comments delayed responses but follows direct 1 step commands. Spouse present and supportive   Active ROM Lower Body    Active ROM Lower Body  WDL   Strength Lower Body   Lower Body Strength  X   Comments remains mildly weak but adequate for functoinal mobility   Balance   Sitting Balance (Static) Good   Sitting Balance (Dynamic) Good   Standing Balance (Static) Fair +   Standing Balance (Dynamic) Fair   Weight Shift Sitting Good   Weight Shift Standing Fair    Skilled Intervention Verbal Cuing   Comments w/o AD or HHA   Bed Mobility    Supine to Sit Supervised   Sit to Supine   (seated EOB at end of session)   Scooting Supervised   Gait Analysis   Gait Level Of Assist Contact Guard Assist   Assistive Device None   Distance (Feet) 200   # of Times Distance was Traveled 1   Deviation Ataxic  (variable gait speed)   # of Stairs Climbed 0   Weight Bearing Status no restrictions   Skilled Intervention Verbal Cuing   Comments cues or wayfinding, negotiated turns without signifcant LOB   Functional Mobility   Sit to Stand Supervised   Bed, Chair, Wheelchair Transfer Supervised   Transfer Method Stand Step   How much difficulty does the patient currently have...   Turning over in bed (including adjusting bedclothes, sheets and blankets)? 3   Sitting down on and standing up from a chair with arms (e.g., wheelchair, bedside commode, etc.) 3   Moving from lying on back to sitting on the side of the bed? 3   How much help from another person does the patient currently need...   Moving to and from a bed to a chair (including a wheelchair)? 3   Need to walk in a hospital room? 3   Climbing 3-5 steps with a railing? 2   6 clicks Mobility Score 17   Short Term Goals    Short Term Goal # 1 in 6 visits patient will demo all functional transfers with sup and LRAD for safe DC   Goal Outcome # 1 Goal met   Short Term Goal # 2 in 6 visits patient will demo all bed mobility from a flat surface with Malcolm for safe DC   Goal Outcome # 2 Goal not met   Short Term Goal # 3 in 6 visits patient will ambulate 200' with LRAD for safe DC   Goal Outcome # 3 Goal met   Physical Therapy Treatment Plan   Reason For Discharge Discharge Secondary to Goals Met   Anticipated Discharge Equipment and Recommendations   DC Equipment Recommendations None   Discharge Recommendations Anticipate that the patient will have no further physical therapy needs after discharge from the hospital  (possible caregiver/ respite  resources ;however, spouse reports they have not qualified in the past based on income)

## 2024-01-17 NOTE — PROGRESS NOTES
Received report from previous shift RN.  Assessment complete. Patient's  at bedside.  A&O x 2. Patient calls appropriately.  Patient ambulates with hand held assist. Bed alarm on.   Patient denies SOB.  Patient denies pain at this time.  Denies N&V. Tolerating regular diet.  Skin per flowsheets.  + void, + flatus, last BM 1/16.  Patient calm and cooperative with plan of care.  Call light and personal belongings with in reach. Hourly rounding in place. All needs met at this time.

## 2024-01-17 NOTE — THERAPY
Occupational Therapy   Initial Evaluation     Patient Name: Lizett Tabares  Age:  68 y.o., Sex:  female  Medical Record #: [id number]  Today's Date: 1/16/2024       Precautions: Fall Risk  Comments: dementia    Assessment  Patient is 68 y.o. female with a past medical significant for dementia, history of CVA presented to the ER on 1/13/2023 with generalized weakness.  Formal OT eval not indicated as pt's spouse has been her primary caregiver for the past 3 yrs given her dementia.  Spouse educated on the importance of allowing pt to remain as independent as possible & only provide assist once she is unable to complete tasks.  Also discussed caregiver burnout & the importance of pt maintaining her strength.  Spouse encouraged to allow pt to feed herself, engage in grooming & bathing & dressing tasks as able.  Spouse very receptive to new learning.  Recommend HH for additional caregiver training in their home environment.     Plan    Occupational Therapy Initial Treatment Plan   Duration: Evaluation only    DC Equipment Recommendations: Tub / Shower Seat  Discharge Recommendations: Recommend home health for continued occupational therapy services       Objective      Initial Contact Note    Initial Contact Note Order Received and Verified. Occupational Therapy Evaluation NOT Completed Because Patient Does Not Require Acute Occupational Therapy at this Time.   Prior Living Situation   Prior Services Continuous (24 Hour) Care Giving Family   Housing / Facility 1 Story House   Steps Into Home 2   Steps In Home 0   Equipment Owned None   Lives with - Patient's Self Care Capacity Spouse   Comments Pt's  has been her caregiver for the past 3 yrs & assists with all ADL's & homemaking tasks   Prior Level of ADL Function   Self Feeding Requires Assist   Grooming / Hygiene Requires Assist   Bathing Requires Assist   Dressing Requires Assist   Toileting Requires Assist   Prior Level of IADL Function   Medication  Management Dependent   Laundry Dependent   Kitchen Mobility Requires Assist   Finances Dependent   Home Management Dependent   Shopping Dependent   Prior Level Of Mobility Supervision Without Device in Home   Precautions   Precautions Fall Risk   Comments dementia   Vitals   O2 (LPM) 1.5   O2 Delivery Device Nasal Cannula   Pain   Pain Scales 0 to 10 Scale    Intervention Ambulation / Increased Activity   Pain 0 - 10 Group   Therapist Pain Assessment During Activity;Nurse Notified;0   Cognition    Cognition / Consciousness X   Speech/ Communication Delayed Responses;Word Finding Impairment   Level of Consciousness Alert   Ability To Follow Commands Unable to Follow 1 Step Commands   Safety Awareness Impaired   New Learning Impaired   Attention Impaired   Sequencing Impaired   Comments Pt's  is very involved in her care but was receptive to feed back   Balance Assessment   Sitting Balance (Static) Fair +   Sitting Balance (Dynamic) Fair   Standing Balance (Static) Fair   Standing Balance (Dynamic) Fair -   Weight Shift Sitting Fair   Weight Shift Standing Fair   Bed Mobility    Supine to Sit Contact Guard Assist   Sit to Supine Contact Guard Assist   Scooting Contact Guard Assist   Rolling Contact Guard Assist   ADL Assessment   Eating Supervision   Grooming Minimal Assist   Upper Body Dressing Minimal Assist   Lower Body Dressing Minimal Assist   Toileting Minimal Assist   Functional Mobility   Sit to Stand Contact Guard Assist   Bed, Chair, Wheelchair Transfer Contact Guard Assist   Toilet Transfers Contact Guard Assist   Education Group   Education Provided Activities of Daily Living;Home Safety   Role of Occupational Therapist Patient Response Patient;Significant Other;Acceptance;Explanation;Verbal Demonstration   Home Safety Patient Response Significant Other;Acceptance;Explanation;Demonstration;Verbal Demonstration   ADL Patient Response Patient;Significant  Other;Acceptance;Explanation;Demonstration;Verbal Demonstration;Action Demonstration   Occupational Therapy Initial Treatment Plan    Duration Evaluation only   Anticipated Discharge Equipment and Recommendations   DC Equipment Recommendations Tub / Shower Seat   Discharge Recommendations Recommend home health for continued occupational therapy services   Interdisciplinary Plan of Care Collaboration   IDT Collaboration with  Nursing;Family / Caregiver   Patient Position at End of Therapy In Bed;Call Light within Reach;Tray Table within Reach;Family / Friend in Room   Collaboration Comments  present & very supportive   Session Information   Date / Session Number  1/16- Education only

## 2024-01-17 NOTE — DISCHARGE SUMMARY
Discharge Summary    CHIEF COMPLAINT ON ADMISSION  Chief Complaint   Patient presents with    ALOC     Per , pt awoke this morning altered and behaving strangely. Talking to herself.   Generalized weakness.   .   Hx of dementia, not currently baseline.     Weakness       Reason for Admission  Other     Admission Date  1/13/2024    CODE STATUS  Full Code    HPI & HOSPITAL COURSE  This 68-year-old female with a past medical significant for dementia, history of CVA presented to the ER on 1/13/2023 with generalized weakness.     Patient has difficulty ambulating for the past 1 week associated with change in mentation.  Workup in ER CT head unremarkable; MRI brain showed no acute intracranial abnormality, history of prior CVA, thus started on asa and statin.    Patient is requiring oxygen, echocardiogram unremarkable, CT of the chest did not show PE but did show right sided consolidation.  Patient was started on Unasyn and doxycycline.  Hospital. She will be discharged on Augmentin and doxycycline.    During the stay in the hospital, she was initially noted to be hypoxic on oxygen is finally weaned off.  Currently saturating well on room air.    Also her blood pressure is noted to be on the low side, patient be discharged on midodrine along with prednisone 10 mg for 15 days.  She is recommended to follow-up with primary care physician ASAP. For all the other chronic medical condition, she will continue her home meds.    Therefore, she is discharged in good and stable condition to home with close outpatient follow-up.    The patient met 2-midnight criteria for an inpatient stay at the time of discharge.    Discharge Date  1/17/2024    FOLLOW UP ITEMS POST DISCHARGE  Please make sure to make an appointment with PCP at [facility]     DISCHARGE DIAGNOSES  Principal Problem:    Acute respiratory failure with hypoxia (HCC) (POA: Yes)  Active Problems:    Ataxia (POA: Unknown)    Dementia (HCC) (POA: Unknown)     Hyperglycemia (POA: Unknown)    H/O: CVA (cerebrovascular accident) (POA: Unknown)    Syncope and collapse (POA: Yes)    Hypotension (POA: Unknown)    Pneumonia (POA: Unknown)  Resolved Problems:    * No resolved hospital problems. *      FOLLOW UP  No future appointments.  [facility] - [facility]  [address] [place] [id number]  [phone]  Follow up  call to make an appointment      MEDICATIONS ON DISCHARGE     Medication List        START taking these medications        Instructions   albuterol 108 (90 Base) MCG/ACT Aers inhalation aerosol   Inhale 2 Puffs every 6 hours as needed for Shortness of Breath for up to 30 days.  Dose: 2 Puff     amoxicillin-clavulanate 875-125 MG Tabs  Commonly known as: Augmentin   Take 1 Tablet by mouth every 12 hours for 5 days.  Dose: 1 Tablet     aspirin 81 MG EC tablet  Start taking on: January 18, 2024   Take 1 Tablet by mouth every day.  Dose: 81 mg     atorvastatin 40 MG Tabs  Commonly known as: Lipitor   Take 1 Tablet by mouth every evening.  Dose: 40 mg     doxycycline monohydrate 100 MG tablet  Commonly known as: Adoxa   Take 1 Tablet by mouth every 12 hours for 3 days.  Dose: 100 mg     famotidine 20 MG Tabs  Commonly known as: Pepcid   Take 1 Tablet by mouth every evening for 30 days.  Dose: 20 mg     midodrine 10 MG tablet  Commonly known as: Proamatine   Take 1 Tablet by mouth 3 times a day with meals for 15 days.  Dose: 10 mg     polyethylene glycol/lytes Pack  Commonly known as: Miralax   Take 1 Packet by mouth 1 time a day as needed (if sennosides and docusate ineffective after 24 hours) for up to 15 days.  Dose: 17 g     predniSONE 10 MG Tabs  Commonly known as: Deltasone   Take 1 Tablet by mouth every day for 15 days.  Dose: 10 mg     senna-docusate 8.6-50 MG Tabs  Commonly known as: Pericolace Or Senokot S   Take 2 Tablets by mouth 2 times a day.  Dose: 2 Tablet            CONTINUE taking these medications         Instructions   donepezil 10 MG tablet  Commonly known as: Aricept   Take 10 mg by mouth every day.  Dose: 10 mg     escitalopram 20 MG tablet  Commonly known as: Lexapro   Take 20 mg by mouth every day.  Dose: 20 mg     memantine 10 MG Tabs  Commonly known as: Namenda   Take 10 mg by mouth 2 times a day.  Dose: 10 mg     QUEtiapine 100 MG Tabs  Commonly known as: SEROquel   Take 100 mg by mouth 2 times a day.  Dose: 100 mg              Allergies  No Known Allergies    DIET  Orders Placed This Encounter   Procedures    Diet Order Diet: Regular     Standing Status:   Standing     Number of Occurrences:   1     Order Specific Question:   Diet:     Answer:   Regular [1]       ACTIVITY  As tolerated.  Weight bearing as tolerated    CONSULTATIONS  None     PROCEDURES  None     LABORATORY  Lab Results   Component Value Date    SODIUM 141 01/15/2024    POTASSIUM 4.0 01/15/2024    CHLORIDE 106 01/15/2024    CO2 24 01/15/2024    GLUCOSE 100 (H) 01/15/2024    BUN 19 01/15/2024    CREATININE 1.01 01/15/2024        Lab Results   Component Value Date    WBC 5.1 01/17/2024    HEMOGLOBIN 11.1 (L) 01/17/2024    HEMATOCRIT 34.3 (L) 01/17/2024    PLATELETCT 200 01/17/2024        Total time of the discharge process exceeds 39 minutes.

## 2024-01-17 NOTE — CARE PLAN
The patient is Stable - Low risk of patient condition declining or worsening    Shift Goals  Clinical Goals: Wean O2, monitor BP  Patient Goals: Rest    Progress made toward(s) clinical / shift goals:  Oxygen saturation and blood pressures monitored this shift. Patient able to rest comfortably.    Problem: Knowledge Deficit - Standard  Goal: Patient and family/care givers will demonstrate understanding of plan of care, disease process/condition, diagnostic tests and medications  Outcome: Progressing     Problem: Hemodynamics  Goal: Patient's hemodynamics, fluid balance and neurologic status will be stable or improve  Outcome: Progressing     Problem: Fall Risk  Goal: Patient will remain free from falls  Outcome: Progressing       Patient is not progressing towards the following goals: Unable to wean O2 due to inadequate oxygen saturation overnight.

## 2024-01-17 NOTE — PROGRESS NOTES
Report received from RN, assumed care at 0645  Pt is A0X3, and responds appropriately   Pt declines any SOB, chest pain, new onset of numbness/ tingling  Pt rates pain at 0/10, on a scale of 1-10, pt declined PRN pain medication  Pt is voiding adequately and without hesitancy  Pt douglas not  flatus,hypoactive bowel sounds, BM on 1/16  Pt ambulates with a hand held assist   Pt is tolerating a regular diet, pt denies any nausea/vomiting  Plan of care discussed, all questions answered. Explained importance of calling before getting OOB and pt verbalizes understanding. Explained importance of oral care. Call light is within reach, treaded slipper socks on, bed in lowest/ locked position, hourly rounding in place, all needs met at this time

## 2024-01-17 NOTE — DISCHARGE PLANNING
Case Management Discharge Planning    Admission Date: 1/13/2024  GMLOS: 3.6  ALOS: 3    6-Clicks ADL Score: 18  6-Clicks Mobility Score: 9  PT and/or OT Eval ordered: Yes  Post-acute Referrals Ordered: Yes    Anticipated Discharge Dispo: Discharge Disposition: D/T to home under HHA care in anticipation of covered skilled care (06)    DME Needed: Yes, possible oxygen  DME Ordered: No,     Action(s) Taken: CM RN spoke with [facility]  ext [phone] to obtain PCP set-up for home health. Unable to obtain PCP per , insurance unable to accept [employer].    MIRI Osman left message to [employer] [phone] to [facility] in regards to setting up PCP if accepted.    Spoke with  Rigo, stated pt has PCP in [place] but currently lives in [place]. Rigo stated he has been primary caregiver of pt for three years and no home health is needed at this time. Pt is currently on oxygen, may need DME order,  agrees to plan. Rigo to call insurance and obtain PCP for both of them for follow-up appointments post-discharge home.      Escalations Completed: None    Medically Clear: No    Next Steps: MIRI RN will assist with discharge as needed, pending home oxygen DME if needed

## 2024-01-18 ENCOUNTER — PHARMACY VISIT (OUTPATIENT)
Dept: PHARMACY | Facility: MEDICAL CENTER | Age: 69
End: 2024-01-18
Payer: COMMERCIAL

## 2024-01-18 LAB
BACTERIA BLD CULT: NORMAL
BACTERIA BLD CULT: NORMAL
SIGNIFICANT IND 70042: NORMAL
SIGNIFICANT IND 70042: NORMAL
SITE SITE: NORMAL
SITE SITE: NORMAL
SOURCE SOURCE: NORMAL
SOURCE SOURCE: NORMAL

## 2024-01-18 PROCEDURE — RXMED WILLOW AMBULATORY MEDICATION CHARGE: Performed by: HOSPITALIST

## 2024-01-18 RX ORDER — MIDODRINE HYDROCHLORIDE 10 MG/1
TABLET ORAL
Qty: 30 TABLET | Refills: 0 | Status: SHIPPED | OUTPATIENT
Start: 2024-01-18 | End: 2024-01-18 | Stop reason: CLARIF

## 2024-01-18 NOTE — CARE PLAN
The patient is Stable - Low risk of patient condition declining or worsening    Shift Goals  Clinical Goals: Wean O2, monitor BP  Patient Goals: Rest  Family Goals: Rest    Progress made toward(s) clinical / shift goals:  Pt was able to wean from 2 prior to d/c. BP was controlled with use of scheduled BP medications.     Patient is not progressing towards the following goals:

## 2024-01-27 ENCOUNTER — PHARMACY VISIT (OUTPATIENT)
Dept: PHARMACY | Facility: MEDICAL CENTER | Age: 69
End: 2024-01-27
Payer: COMMERCIAL

## 2024-01-27 PROCEDURE — RXMED WILLOW AMBULATORY MEDICATION CHARGE: Performed by: HOSPITALIST

## 2024-02-07 ENCOUNTER — APPOINTMENT (OUTPATIENT)
Dept: RADIOLOGY | Facility: MEDICAL CENTER | Age: 69
End: 2024-02-07
Attending: EMERGENCY MEDICINE
Payer: MEDICARE

## 2024-02-07 ENCOUNTER — HOSPITAL ENCOUNTER (EMERGENCY)
Facility: MEDICAL CENTER | Age: 69
End: 2024-02-07
Attending: EMERGENCY MEDICINE
Payer: MEDICARE

## 2024-02-07 VITALS
WEIGHT: 149.25 LBS | HEART RATE: 64 BPM | TEMPERATURE: 98.4 F | OXYGEN SATURATION: 92 % | DIASTOLIC BLOOD PRESSURE: 61 MMHG | RESPIRATION RATE: 18 BRPM | SYSTOLIC BLOOD PRESSURE: 98 MMHG | BODY MASS INDEX: 24.84 KG/M2

## 2024-02-07 DIAGNOSIS — I95.9 HYPOTENSION, UNSPECIFIED HYPOTENSION TYPE: ICD-10-CM

## 2024-02-07 DIAGNOSIS — J96.01 ACUTE RESPIRATORY FAILURE WITH HYPOXIA (HCC): ICD-10-CM

## 2024-02-07 LAB
ALBUMIN SERPL BCP-MCNC: 4 G/DL (ref 3.2–4.9)
ALBUMIN/GLOB SERPL: 1.3 G/DL
ALP SERPL-CCNC: 108 U/L (ref 30–99)
ALT SERPL-CCNC: 24 U/L (ref 2–50)
ANION GAP SERPL CALC-SCNC: 11 MMOL/L (ref 7–16)
AST SERPL-CCNC: 17 U/L (ref 12–45)
BASOPHILS # BLD AUTO: 0.8 % (ref 0–1.8)
BASOPHILS # BLD: 0.06 K/UL (ref 0–0.12)
BILIRUB SERPL-MCNC: 0.3 MG/DL (ref 0.1–1.5)
BUN SERPL-MCNC: 21 MG/DL (ref 8–22)
CALCIUM ALBUM COR SERPL-MCNC: 9.1 MG/DL (ref 8.5–10.5)
CALCIUM SERPL-MCNC: 9.1 MG/DL (ref 8.5–10.5)
CHLORIDE SERPL-SCNC: 107 MMOL/L (ref 96–112)
CO2 SERPL-SCNC: 24 MMOL/L (ref 20–33)
CREAT SERPL-MCNC: 1.15 MG/DL (ref 0.5–1.4)
EKG IMPRESSION: NORMAL
EOSINOPHIL # BLD AUTO: 0.26 K/UL (ref 0–0.51)
EOSINOPHIL NFR BLD: 3.3 % (ref 0–6.9)
ERYTHROCYTE [DISTWIDTH] IN BLOOD BY AUTOMATED COUNT: 49 FL (ref 35.9–50)
GFR SERPLBLD CREATININE-BSD FMLA CKD-EPI: 51 ML/MIN/1.73 M 2
GLOBULIN SER CALC-MCNC: 3 G/DL (ref 1.9–3.5)
GLUCOSE SERPL-MCNC: 141 MG/DL (ref 65–99)
HCT VFR BLD AUTO: 43.9 % (ref 37–47)
HGB BLD-MCNC: 13.6 G/DL (ref 12–16)
IMM GRANULOCYTES # BLD AUTO: 0.03 K/UL (ref 0–0.11)
IMM GRANULOCYTES NFR BLD AUTO: 0.4 % (ref 0–0.9)
LACTATE SERPL-SCNC: 1.7 MMOL/L (ref 0.5–2)
LYMPHOCYTES # BLD AUTO: 1.54 K/UL (ref 1–4.8)
LYMPHOCYTES NFR BLD: 19.3 % (ref 22–41)
MCH RBC QN AUTO: 28.5 PG (ref 27–33)
MCHC RBC AUTO-ENTMCNC: 31 G/DL (ref 32.2–35.5)
MCV RBC AUTO: 91.8 FL (ref 81.4–97.8)
MONOCYTES # BLD AUTO: 0.65 K/UL (ref 0–0.85)
MONOCYTES NFR BLD AUTO: 8.1 % (ref 0–13.4)
NEUTROPHILS # BLD AUTO: 5.46 K/UL (ref 1.82–7.42)
NEUTROPHILS NFR BLD: 68.1 % (ref 44–72)
NRBC # BLD AUTO: 0 K/UL
NRBC BLD-RTO: 0 /100 WBC (ref 0–0.2)
PLATELET # BLD AUTO: 217 K/UL (ref 164–446)
PMV BLD AUTO: 10 FL (ref 9–12.9)
POTASSIUM SERPL-SCNC: 4.4 MMOL/L (ref 3.6–5.5)
PROT SERPL-MCNC: 7 G/DL (ref 6–8.2)
RBC # BLD AUTO: 4.78 M/UL (ref 4.2–5.4)
SODIUM SERPL-SCNC: 142 MMOL/L (ref 135–145)
WBC # BLD AUTO: 8 K/UL (ref 4.8–10.8)

## 2024-02-07 PROCEDURE — A9270 NON-COVERED ITEM OR SERVICE: HCPCS | Performed by: EMERGENCY MEDICINE

## 2024-02-07 PROCEDURE — 36415 COLL VENOUS BLD VENIPUNCTURE: CPT

## 2024-02-07 PROCEDURE — 700102 HCHG RX REV CODE 250 W/ 637 OVERRIDE(OP): Performed by: EMERGENCY MEDICINE

## 2024-02-07 PROCEDURE — 93005 ELECTROCARDIOGRAM TRACING: CPT

## 2024-02-07 PROCEDURE — 700105 HCHG RX REV CODE 258: Performed by: EMERGENCY MEDICINE

## 2024-02-07 PROCEDURE — 99284 EMERGENCY DEPT VISIT MOD MDM: CPT

## 2024-02-07 PROCEDURE — 87040 BLOOD CULTURE FOR BACTERIA: CPT

## 2024-02-07 PROCEDURE — 71045 X-RAY EXAM CHEST 1 VIEW: CPT

## 2024-02-07 PROCEDURE — 93005 ELECTROCARDIOGRAM TRACING: CPT | Performed by: EMERGENCY MEDICINE

## 2024-02-07 PROCEDURE — 83605 ASSAY OF LACTIC ACID: CPT

## 2024-02-07 PROCEDURE — 85025 COMPLETE CBC W/AUTO DIFF WBC: CPT

## 2024-02-07 PROCEDURE — 80053 COMPREHEN METABOLIC PANEL: CPT

## 2024-02-07 RX ORDER — MIDODRINE HYDROCHLORIDE 10 MG/1
10 TABLET ORAL
Qty: 90 TABLET | Refills: 0 | Status: SHIPPED | OUTPATIENT
Start: 2024-02-07 | End: 2024-02-07

## 2024-02-07 RX ORDER — FAMOTIDINE 20 MG/1
20 TABLET, FILM COATED ORAL NIGHTLY
Qty: 30 TABLET | Refills: 0 | Status: SHIPPED | OUTPATIENT
Start: 2024-02-07 | End: 2024-03-08

## 2024-02-07 RX ORDER — FAMOTIDINE 20 MG/1
20 TABLET, FILM COATED ORAL NIGHTLY
Qty: 30 TABLET | Refills: 0 | Status: SHIPPED | OUTPATIENT
Start: 2024-02-07 | End: 2024-02-07

## 2024-02-07 RX ORDER — SODIUM CHLORIDE, SODIUM LACTATE, POTASSIUM CHLORIDE, AND CALCIUM CHLORIDE .6; .31; .03; .02 G/100ML; G/100ML; G/100ML; G/100ML
1000 INJECTION, SOLUTION INTRAVENOUS ONCE
Status: COMPLETED | OUTPATIENT
Start: 2024-02-07 | End: 2024-02-07

## 2024-02-07 RX ORDER — MIDODRINE HYDROCHLORIDE 5 MG/1
10 TABLET ORAL ONCE
Status: COMPLETED | OUTPATIENT
Start: 2024-02-07 | End: 2024-02-07

## 2024-02-07 RX ORDER — MIDODRINE HYDROCHLORIDE 10 MG/1
10 TABLET ORAL
Qty: 90 TABLET | Refills: 0 | Status: SHIPPED | OUTPATIENT
Start: 2024-02-07

## 2024-02-07 RX ADMIN — MIDODRINE HYDROCHLORIDE 10 MG: 5 TABLET ORAL at 14:45

## 2024-02-07 RX ADMIN — SODIUM CHLORIDE, POTASSIUM CHLORIDE, SODIUM LACTATE AND CALCIUM CHLORIDE 1000 ML: 600; 310; 30; 20 INJECTION, SOLUTION INTRAVENOUS at 13:00

## 2024-02-07 ASSESSMENT — FIBROSIS 4 INDEX: FIB4 SCORE: 1.69

## 2024-02-07 NOTE — ED TRIAGE NOTES
"Chief Complaint   Patient presents with    Sent by MD     Pt sent by [facility] for concern due to hypotension 80/50 and aloc.  Recent hospitalization for pneumonia 3 weeks ago.      Shortness of Breath     Pt noted to be 84% on RA in triage, but improved to 92% within a few minutes.       Sepsis alert = 4.  Pt has dementia at baseline, per  she is \"more lethargic\" than usual.    "

## 2024-02-07 NOTE — ED NOTES
DC home with written and verbal instructions regarding f/u, activity and RX. Pt and pt's  verbalized understanding, ambulated out.

## 2024-02-07 NOTE — ED PROVIDER NOTES
"  ER Provider Note    Scribed for Alia Cabrera M.D. by Melvin Vigil. 2/7/2024  12:24 PM    Primary Care Provider: Pcp Pt States None    CHIEF COMPLAINT  Chief Complaint   Patient presents with    Sent by MD     Pt sent by [facility] for concern due to hypotension 80/50 and aloc.  Recent hospitalization for pneumonia 3 weeks ago.      Shortness of Breath     Pt noted to be 84% on RA in triage, but improved to 92% within a few minutes.       EXTERNAL RECORDS REVIEWED  Inpatient Notes indicate the patient was admitted the 13th through the 17th last month for altered mental status, and she was diagnosed with right sided pneumonia.    HPI/ROS  LIMITATION TO HISTORY   Select: Altered mental status / Confusion  OUTSIDE HISTORIAN(S):  Family  Rigo at bedside to provide primary history of present illness as detailed below due to history of dementia    Lizett Tabares is a 68 y.o. female with a history of dementia who presents to the ED for evaluation of hypotension onset this morning ago. The patient's  states she also has shortness of breath, but she denies any shortness of breath, fever, or cough. The patient's  states she was admitted 3 weeks ago for pneumonia. When asked why she is here today, she states \"I'm on vacation\". No alleviating or exacerbating factors were noted. She normally wears oxygen at baseline, although she is not using any now as her  is trying to wean her off of it. Her daily medications include Midodrine 10 mg, she is due for another dose at 1 PM    PAST MEDICAL HISTORY  Past Medical History:   Diagnosis Date    Dementia (HCC)     High cholesterol      SURGICAL HISTORY  No past surgical history noted.    FAMILY HISTORY  No family history noted.    SOCIAL HISTORY   reports that she has quit smoking. Her smoking use included cigarettes. She has never used smokeless tobacco. She reports that she does not currently use alcohol. She reports that she does not currently use " drugs.    CURRENT MEDICATIONS  Discharge Medication List as of 2/7/2024  2:55 PM        CONTINUE these medications which have NOT CHANGED    Details   aspirin 81 MG EC tablet Take 1 Tablet by mouth every day., Disp-100 Tablet, R-0, Normal      atorvastatin (LIPITOR) 40 MG Tab Take 1 Tablet by mouth every evening., Disp-30 Tablet, R-0, Normal      senna-docusate (PERICOLACE OR SENOKOT S) 8.6-50 MG Tab Take 2 Tablets by mouth 2 times a day., Disp-30 Tablet, R-0, Normal      albuterol 108 (90 Base) MCG/ACT Aero Soln inhalation aerosol Inhale 2 Puffs every 6 hours as needed for Shortness of Breath for up to 30 days., Disp-1 Each, R-0, Normal      donepezil (ARICEPT) 10 MG tablet Take 10 mg by mouth every day., Historical Med      escitalopram (LEXAPRO) 20 MG tablet Take 20 mg by mouth every day., Historical Med      QUEtiapine (SEROQUEL) 100 MG Tab Take 100 mg by mouth 2 times a day., Historical Med      memantine (NAMENDA) 10 MG Tab Take 10 mg by mouth 2 times a day., Historical Med           ALLERGIES  Patient has no known allergies.    PHYSICAL EXAM  BP 93/68   Pulse (!) 104   Temp 36.8 °C (98.2 °F) (Temporal)   Resp 18   Wt 67.7 kg (149 lb 4 oz)   SpO2 90%   BMI 24.84 kg/m²  Constitutional: Alert in no apparent distress.  HENT: No signs of trauma, Bilateral external ears normal, Nose normal.   Eyes: Pupils are equal and reactive, Conjunctiva normal, Non-icteric.   Neck: No stridor.   Cardiovascular: Regular rate and rhythm, no murmurs.   Thorax & Lungs: Normal breath sounds, No respiratory distress, No wheezing, No chest tenderness.   Abdomen: Bowel sounds normal, Soft, No tenderness, No masses, No peritoneal signs.  Skin: Warm, Dry, No erythema, No rash.   Musculoskeletal:  No major deformities noted.  Neurologic: Alert and oriented x2, Expressive aphasia, moving all extremities without difficulty, no focal deficits.    DIAGNOSTIC STUDIES & PROCEDURES    Labs:   Labs Reviewed   CBC WITH DIFFERENTIAL -  Abnormal; Notable for the following components:       Result Value    MCHC 31.0 (*)     Lymphocytes 19.30 (*)     All other components within normal limits   COMP METABOLIC PANEL - Abnormal; Notable for the following components:    Glucose 141 (*)     Alkaline Phosphatase 108 (*)     All other components within normal limits   ESTIMATED GFR - Abnormal; Notable for the following components:    GFR (CKD-EPI) 51 (*)     All other components within normal limits   LACTIC ACID   URINALYSIS   URINE CULTURE(NEW)   BLOOD CULTURE    Narrative:     Blood Cultures X2. Draw one blood culture from central line  and one blood culture peripherally. If no line present, draw  blood cultures times two peripherally from different sites.   BLOOD CULTURE    Narrative:     Blood Cultures X2. Blood Cultures X2. Draw one blood culture  from central line and one blood culture peripherally. If no  line present, draw blood cultures times two peripherally from  different sites.    All labs reviewed by me.    EKG:   I have independently interpreted this EKG as seen below.  Results for orders placed or performed during the hospital encounter of 24   EKG   Result Value Ref Range    Report       [facility] Emergency Dept.    Test Date:  2024  Pt Name:    FREDDIE GIRON               Department: ER  MRN:        [id number]                      Room:       [facility]  Gender:     Female                       Technician: [id number]  :        [id number]                   Requested By:ER TRIAGE PROTOCOL  Order #:    [id number]                    Reading MD: ZBIGNIEW KIDD    Measurements  Intervals                                Axis  Rate:       83                           P:          67  IA:         166                          QRS:        -16  QRSD:       92                           T:          51  QT:         378  QTc:        445    Interpretive Statements  Sinus rhythm  Borderline left axis deviation  Artifact in lead(s)  aVR,V1,V2,V3,V4,V5,V6  Compared to ECG 01/14/2024 08:05:39  T-wave abnormality no longer present  Impression: Sinus rhythm without evidence of ischemia.  Electronically Signed On 02- 14:27:25 PST by ZBIGNIEW KIDD        Radiology:   The attending Emergency Physician has independently interpreted the diagnostic imaging associated with this visit and is awaiting the final reading from the radiologist, which will be displayed below.    Preliminary interpretation is a follows: Normal-appearing chest  Radiologist interpretation:   DX-CHEST-PORTABLE (1 VIEW)   Final Result      No acute cardiopulmonary disease.         COURSE & MEDICAL DECISION MAKING    ED Observation Status? No; Patient does not meet criteria for ED Observation.     12:24 PM - Patient seen and evaluated at bedside. Patient will be treated with LR bolus 1000 mL for her symptoms. Ordered DX-Chest, Lactic Acid, CBC w/diff, CMP, UA, Urine Cultures, Blood Cultures, and EKG to evaluate. She understands and agrees to the plan of care. Differential diagnoses include but are not limited to: Dehydration, Electrolyte imbalance, or Sepsis.    2:09 PM - Patient was reevaluated at bedside. She will be medicated with Midodrine 10 mg PO. Discussed lab and radiology results with the patient's  and informed them that she likely does need to continue to wear oxygen, but further titration decisions would be better made by her primary care provider. I discussed plan for discharge and follow up as outlined below. The patient is stable for discharge at this time and will return for any new or worsening symptoms. Patient verbalizes understanding and support with my plan for discharge.       BP 98/61   Pulse 64   Temp 36.9 °C (98.4 °F) (Temporal)   Resp 18   Wt 67.7 kg (149 lb 4 oz)   SpO2 92%   BMI 24.84 kg/m²      INITIAL ASSESSMENT AND PLAN  Care Narrative: This is a 68-year-old female that presents for evaluation of low blood pressure and elevated heart  rate.  However here her heart rate is normal.  Her blood pressure is minimally low but she is on midodrine.  She was given IV fluids and has improvement of her vital signs.  Her labs are otherwise unremarkable.  Her oxygen saturation is hovering between 89-92 which she is typically on 1 L at home.  At this point her labs are also within normal limits no evidence of significant infection.  Lactate is normal.  CMP is normal.  At this point given additional history from the  I do not think she has any indication for hospitalization at this time.  I do think patient can be discharged in the care of her  he is agreeable.        HYDRATION: Based on the patient's presentation of Eldery ALOC, Hypotension, Sepsis, and Tachycardia the patient was given IV fluids. IV Hydration was used because oral hydration was not adequate alone. Upon recheck following hydration, the patient was improved.    ADDITIONAL PROBLEM LIST  Prior cva  Prior pneumonia    DISPOSITION AND DISCUSSIONS  I have discussed management of the patient with the following physicians and TYSHAWN's:  None    Discussion of management with other QHP or appropriate source(s): None     Escalation of care considered, and ultimately not performed: acute inpatient care management, however at this time, the patient is most appropriate for outpatient management.    Barriers to care at this time, including but not limited to: Patient does not have established PCP.     Decision tools and prescription drugs considered including, but not limited to: Medication modification Midodrine 10 mg PO and Famotidine 20 mg PO .    The patient will return for new or worsening symptoms and is stable at the time of discharge. Patient was given return precautions. Patient and/or family member verbalizes understanding and will comply.    DISPOSITION:  Patient will be discharged home in stable condition.    FOLLOW UP:  Deborah Neves P.A.-C.  [address] [place] [address]  [id number]  [phone]    Schedule an appointment as soon as possible for a visit       [facility], Emergency Dept  [address] [facility] [id number] [phone]    Return to the emergency department for worsening weakness lightheadedness or other concerns.      OUTPATIENT MEDICATIONS:  Discharge Medication List as of 2/7/2024  2:55 PM        Discharge Medication List as of 2/7/2024  2:55 PM        CONTINUE these medications which have CHANGED    Details   famotidine (PEPCID) 20 MG Tab Take 1 Tablet by mouth every evening for 30 days., Disp-30 Tablet, R-0, Normal      midodrine (PROAMATINE) 10 MG tablet Take 1 Tablet by mouth 3 times a day with meals., Disp-90 Tablet, R-0, Normal            FINAL DIANGOSIS  1. Hypotension, unspecified hypotension type    2. Acute respiratory failure with hypoxia (HCC)       Melvin ROBERT (Scribe), am scribing for, and in the presence of, Alia Cabrera M.D..    Electronically signed by: Melvin Vigil (Scribe), 2/7/2024    IAlia M.D. personally performed the services described in this documentation, as scribed by Melvin Vigil in my presence, and it is both accurate and complete.      The note accurately reflects work and decisions made by me.  Alia Cabrera M.D.  2/7/2024  5:22 PM

## 2024-06-28 ENCOUNTER — HOSPITAL ENCOUNTER (EMERGENCY)
Facility: MEDICAL CENTER | Age: 69
End: 2024-06-28
Attending: EMERGENCY MEDICINE
Payer: MEDICARE

## 2024-06-28 VITALS
HEIGHT: 65 IN | SYSTOLIC BLOOD PRESSURE: 125 MMHG | WEIGHT: 160 LBS | HEART RATE: 89 BPM | TEMPERATURE: 98.2 F | BODY MASS INDEX: 26.66 KG/M2 | RESPIRATION RATE: 17 BRPM | DIASTOLIC BLOOD PRESSURE: 77 MMHG | OXYGEN SATURATION: 96 %

## 2024-06-28 DIAGNOSIS — F03.90 DEMENTIA WITHOUT BEHAVIORAL DISTURBANCE, PSYCHOTIC DISTURBANCE, MOOD DISTURBANCE, OR ANXIETY, UNSPECIFIED DEMENTIA SEVERITY, UNSPECIFIED DEMENTIA TYPE (HCC): ICD-10-CM

## 2024-06-28 PROCEDURE — 99284 EMERGENCY DEPT VISIT MOD MDM: CPT

## 2024-06-28 ASSESSMENT — FIBROSIS 4 INDEX: FIB4 SCORE: 1.14

## 2024-06-28 NOTE — ED PROVIDER NOTES
ED Provider Note    CHIEF COMPLAINT  Chief Complaint   Patient presents with    Other     Hx dementia,  is sole caregiver at home.  BIB EMS from [place], critically ill. EMS team brought pt wit him as there was no one to care for her. Pt is pleasantly confused, believes her  is well & having a checkup. No medical concerns, alert.        EXTERNAL RECORDS REVIEWED      HPI/ROS  LIMITATION TO HISTORY   Select: Altered mental status / Confusion  OUTSIDE HISTORIAN(S):  Son at bedside        Lizett Tabares is a 69 y.o. female who presents to the emergency department with chief complaint of social wellbeing.  Patient  had MI today and was transported here.  The patient was transported with her  as she has a history of severe dementia and her  is her sole caregiver.  Patient's had no illness no falls no other acute symptom change or concern and is otherwise in her usual state of being.        PAST MEDICAL HISTORY   has a past medical history of Dementia (HCC) and High cholesterol.    SURGICAL HISTORY  patient denies any surgical history    FAMILY HISTORY  No family history on file.    SOCIAL HISTORY  Social History     Tobacco Use    Smoking status: Former     Types: Cigarettes    Smokeless tobacco: Never   Substance and Sexual Activity    Alcohol use: Not Currently    Drug use: Not Currently    Sexual activity: Not on file       CURRENT MEDICATIONS  Home Medications       Reviewed by Peri Barnes R.N. (Registered Nurse) on 06/28/24 at 1614  Med List Status: Not Addressed     Medication Last Dose Status   aspirin 81 MG EC tablet  Active   atorvastatin (LIPITOR) 40 MG Tab  Active   donepezil (ARICEPT) 10 MG tablet  Active   escitalopram (LEXAPRO) 20 MG tablet  Active   memantine (NAMENDA) 10 MG Tab  Active   midodrine (PROAMATINE) 10 MG tablet  Active   QUEtiapine (SEROQUEL) 100 MG Tab  Active   senna-docusate (PERICOLACE OR SENOKOT S) 8.6-50 MG Tab  Active               "      ALLERGIES  No Known Allergies    PHYSICAL EXAM  VITAL SIGNS: /78   Pulse 98   Temp 36.1 °C (97 °F) (Temporal)   Resp 16   Ht 1.651 m (5' 5\")   Wt 72.6 kg (160 lb)   SpO2 92%   BMI 26.63 kg/m²      Pulse ox interpretation: I interpret this pulse ox as normal.  Constitutional: Presently confused, no acute distress  HEENT: Atraumatic normocephalic, pupils are equal round reactive to light extraocular movements are intact. The nares is clear, external ears are normal, mouth shows moist mucous membranes normal dentition for age  Neck: Supple, no JVD no tracheal deviation  Cardiovascular: Regular rate and rhythm no murmur rub or gallop 2+ pulses peripherally x4  Thorax & Lungs: No respiratory distress, no wheezes rales or rhonchi, No chest tenderness.   GI: Soft nontender nondistended positive bowel sounds, no peritoneal signs  Skin: Warm dry no acute rash or lesion  Musculoskeletal: Moving all extremities with full range and 5 of 5 strength no acute  deformity  Neurologic: Cranial nerves III through XII are grossly intact no sensory deficit no cerebellar dysfunction   Psychiatric: Pleasantly confused    COURSE & MEDICAL DECISION MAKING    ASSESSMENT, COURSE AND PLAN  Care Narrative: Normal vital signs physical exam is completely unremarkable.  Social work has worked with family members and has found appropriate family members to help care for the patient.  She has had no other acute concerns in the emergency department.  Patient discharged in stable condition with family members.      FINAL DIAGNOSIS  1.  Encounter for social planning       Electronically signed by: Doc Boo M.D.      "

## 2024-06-28 NOTE — ED TRIAGE NOTES
Lizett Tabares  69 y.o.  female  Chief Complaint   Patient presents with    Other     Hx dementia,  is sole caregiver at home.  BIB EMS from [place], critically ill. EMS team brought pt wit him as there was no one to care for her. Pt is pleasantly confused, believes her  is well & having a checkup. No medical concerns, alert.       Pt placed on bed alarm on arrival. [facility]. ED SW aware of pt.

## 2024-06-28 NOTE — DISCHARGE PLANNING
YESY met with Pt bedside and the Pt stated that she has two son's Emil and Alli.  YESY completed [employer] search and was able to identify Pt's Son Alli.  YESY called Alli and updated him that his parent's are at [facility], he is on his way to the hospital now.    SON: Alli Tabares [phone]

## 2024-06-29 NOTE — ED NOTES
Bedside report received from previous shift.   Assumed patient care. Verified patient identification.  Checked on bed,with unlabored respirations. Discussed plan of care.   Vital signs is stable. Denied any new complaints.   Gurney in low position, side rail up for pt safety.  No needs identified at the moment.     Contraptions: none  Alert and Oriented: alert but confused  Ambulatory: yes  Oxygen: none  Pending: awaiting for son to get her home, unsafe to be discharge alone, due to dementia

## 2024-06-29 NOTE — ED NOTES
Pt son came to get the pt  Pt discharged to home. Discharge paperwork provided. Education provided by ERP. Reinforced discharge instructions.  Pt was given follow up instructions   Pt verbalized understanding of all instructions for discharge.   Patient went out of the ER ambulatory with steady gait., alert and oriented, with all belongings.

## 2024-06-29 NOTE — ED NOTES
Pt given meal tray. Son left & will be returning to bring her home. In direct view of 1:1 sitter (for elopement & falls).

## 2024-07-31 ENCOUNTER — HOSPITAL ENCOUNTER (EMERGENCY)
Facility: MEDICAL CENTER | Age: 69
End: 2024-07-31
Attending: EMERGENCY MEDICINE
Payer: MEDICARE

## 2024-07-31 VITALS
RESPIRATION RATE: 18 BRPM | TEMPERATURE: 98.6 F | SYSTOLIC BLOOD PRESSURE: 117 MMHG | DIASTOLIC BLOOD PRESSURE: 56 MMHG | HEART RATE: 113 BPM | OXYGEN SATURATION: 85 % | HEIGHT: 65 IN | BODY MASS INDEX: 24.99 KG/M2 | WEIGHT: 150 LBS

## 2024-07-31 DIAGNOSIS — K56.41 FECAL IMPACTION (HCC): ICD-10-CM

## 2024-07-31 PROCEDURE — 99284 EMERGENCY DEPT VISIT MOD MDM: CPT

## 2024-07-31 RX ORDER — BISACODYL 10 MG
10 SUPPOSITORY, RECTAL RECTAL DAILY
Status: DISCONTINUED | OUTPATIENT
Start: 2024-07-31 | End: 2024-07-31 | Stop reason: HOSPADM

## 2024-07-31 RX ORDER — DOCUSATE SODIUM 100 MG/1
100 CAPSULE, LIQUID FILLED ORAL 2 TIMES DAILY
Qty: 60 CAPSULE | Refills: 0 | Status: SHIPPED | OUTPATIENT
Start: 2024-07-31

## 2024-07-31 ASSESSMENT — FIBROSIS 4 INDEX: FIB4 SCORE: 1.14

## 2024-07-31 NOTE — ED TRIAGE NOTES
Chief Complaint   Patient presents with    Constipation     X4 days. Pt's  states they've tried laxatives and prune juice with no relief.       Patient wheeled to triage for above complaint. Patient A&Ox4, GCS 15, patient speaking in full sentences. Equal and unlabored respirations. Patient educated on triage process and encouraged to notify staff if condition worsens. Appropriate protocols ordered. Patient returned to the lobby in stable condition.

## 2024-07-31 NOTE — ED PROVIDER NOTES
"ED Provider Note    CHIEF COMPLAINT  Chief Complaint   Patient presents with    Constipation     X4 days. Pt's  states they've tried laxatives and prune juice with no relief.      HPI/ROS    Lizett Tabares is a 69 y.o. female who presents with constipation.  The patient has not stooled in 3 days.  The patient's  attempted disimpaction as well as prune juice with oral laxatives with no success.  The patient has not had any vomiting.  She does have underlying dementia and therefore the  does provide all the history.    PAST MEDICAL HISTORY   has a past medical history of Dementia (HCC) and High cholesterol.    SURGICAL HISTORY  patient denies any surgical history    FAMILY HISTORY  History reviewed. No pertinent family history.    SOCIAL HISTORY  Social History     Tobacco Use    Smoking status: Former     Types: Cigarettes    Smokeless tobacco: Never   Substance and Sexual Activity    Alcohol use: Not Currently    Drug use: Not Currently    Sexual activity: Not on file       CURRENT MEDICATIONS  Home Medications       Reviewed by Alberta Sequeira R.N. (Registered Nurse) on 07/31/24 at 1629  Med List Status: Partial     Medication Last Dose Status   aspirin 81 MG EC tablet  Active   atorvastatin (LIPITOR) 40 MG Tab  Active   donepezil (ARICEPT) 10 MG tablet  Active   escitalopram (LEXAPRO) 20 MG tablet  Active   memantine (NAMENDA) 10 MG Tab  Active   midodrine (PROAMATINE) 10 MG tablet  Active   QUEtiapine (SEROQUEL) 100 MG Tab  Active   senna-docusate (PERICOLACE OR SENOKOT S) 8.6-50 MG Tab  Active                    ALLERGIES  No Known Allergies    PHYSICAL EXAM  VITAL SIGNS: /72   Pulse (!) 136   Temp 36.2 °C (97.2 °F) (Temporal)   Resp 18   Ht 1.651 m (5' 5\")   Wt 68 kg (150 lb)   SpO2 91%   BMI 24.96 kg/m²    In general the patient does not appear toxic    Pulmonary the patient's lungs are clear to auscultation bilaterally    Cardiovascular S1-S2 with a tachycardic " rate    GI abdomen soft with no distention    Rectal examination the patient is impacted with a large amount of stool      PROCEDURES fecal disimpaction  Was able to disimpact a large amount of stool from the rectal vault with a significant mount of discomfort.  The patient does have a large external hemorrhoid.      COURSE & MEDICAL DECISION MAKING    This is 69-year-old female who presents the emerged part with constipation.  I was able to disimpact a large amount of stool.  The patient's abdomen is nonsurgical.  Will place the patient on Colace orally and have her perform sitz bath's and disimpaction at home.   and the patient will return for any vomiting or abdominal discomfort or distention.    FINAL DIAGNOSIS  1.  Fecal impaction  2.  Fecal disimpaction    Disposition  The patient will be discharged in stable condition     Electronically signed by: Oziel Carrera M.D., 7/31/2024 4:54 PM

## 2024-08-01 NOTE — ED NOTES
RN reviewed d/c instructions w/ patient *and visitor*  including follow up and prescription information. Pt is alert and oriented. Pt  / visitor verbalized understanding of all instructions for discharge and is agreeable to plan of care. Pt is ambulatory out of ED with

## 2024-08-01 NOTE — DISCHARGE PLANNING
YESY met with Pt spouse and gave resources for care for his wife. Pt is primary care giver for his wife who has dementia. He needs to have bi pass surgery so needs care for his wife. YESY gave all resources, GH list and CBC waiver application. Questions and support offered.

## 2024-08-01 NOTE — ED NOTES
This RN and ERP at bedside , pt had bowel movement , pt cleaned up and placed in clean pants , pts  requesting to speak to  , this RN contacted
